# Patient Record
Sex: FEMALE | Race: WHITE | NOT HISPANIC OR LATINO | Employment: UNEMPLOYED | URBAN - METROPOLITAN AREA
[De-identification: names, ages, dates, MRNs, and addresses within clinical notes are randomized per-mention and may not be internally consistent; named-entity substitution may affect disease eponyms.]

---

## 2017-01-10 ENCOUNTER — OFFICE VISIT (OUTPATIENT)
Dept: INTERNAL MEDICINE | Facility: CLINIC | Age: 47
End: 2017-01-10

## 2017-01-10 ENCOUNTER — TELEPHONE (OUTPATIENT)
Dept: INTERNAL MEDICINE | Facility: CLINIC | Age: 47
End: 2017-01-10

## 2017-01-10 VITALS
OXYGEN SATURATION: 99 % | HEIGHT: 61 IN | WEIGHT: 176.2 LBS | BODY MASS INDEX: 33.27 KG/M2 | SYSTOLIC BLOOD PRESSURE: 110 MMHG | HEART RATE: 109 BPM | DIASTOLIC BLOOD PRESSURE: 80 MMHG

## 2017-01-10 DIAGNOSIS — J06.9 ACUTE URI: Primary | ICD-10-CM

## 2017-01-10 DIAGNOSIS — Z79.4 TYPE 2 DIABETES MELLITUS WITHOUT COMPLICATION, WITH LONG-TERM CURRENT USE OF INSULIN (HCC): ICD-10-CM

## 2017-01-10 DIAGNOSIS — Z01.818 PREOPERATIVE CLEARANCE: ICD-10-CM

## 2017-01-10 DIAGNOSIS — E11.9 TYPE 2 DIABETES MELLITUS WITHOUT COMPLICATION, WITH LONG-TERM CURRENT USE OF INSULIN (HCC): ICD-10-CM

## 2017-01-10 LAB — HBA1C MFR BLD: 11.1 %

## 2017-01-10 PROCEDURE — 83036 HEMOGLOBIN GLYCOSYLATED A1C: CPT | Performed by: INTERNAL MEDICINE

## 2017-01-10 PROCEDURE — 90471 IMMUNIZATION ADMIN: CPT | Performed by: INTERNAL MEDICINE

## 2017-01-10 PROCEDURE — 90656 IIV3 VACC NO PRSV 0.5 ML IM: CPT | Performed by: INTERNAL MEDICINE

## 2017-01-10 PROCEDURE — 99214 OFFICE O/P EST MOD 30 MIN: CPT | Performed by: INTERNAL MEDICINE

## 2017-01-10 RX ORDER — AZITHROMYCIN 250 MG/1
TABLET, FILM COATED ORAL
Qty: 6 TABLET | Refills: 0 | Status: SHIPPED | OUTPATIENT
Start: 2017-01-10 | End: 2017-04-12

## 2017-01-10 NOTE — MR AVS SNAPSHOT
Crystal CARVERATT   1/10/2017 9:20 AM   Office Visit    Dept Phone:  [phone]   Encounter #:  [id number]    Provider:  Kleber Mcdonald MD   Department:  [facility] INTERNAL MED AND PEDS                Your Full Care Plan              Today's Medication Changes          These changes are accurate as of: 1/10/17 10:30 AM.  If you have any questions, ask your nurse or doctor.               New Medication(s)Ordered:     azithromycin 250 MG tablet   Commonly known as:  ZITHROMAX Z-KENDELL   Take 2 tablets the first day, then 1 tablet daily for 4 days.   Started by:  Kleber Mcdonald MD            Where to Get Your Medications      These medications were sent to [facility] [address] [place] [address] [id number] AT [facility] [phone] [facility] [phone]   [address] [facility], [facility] [address]     Phone:  [phone]     azithromycin 250 MG tablet                  Your Updated Medication List          This list is accurate as of: 1/10/17 10:30 AM.  Always use your most recent med list.                atorvastatin 20 MG tablet   Commonly known as:  LIPITOR   Take 1 tablet by mouth daily.       azithromycin 250 MG tablet   Commonly known as:  ZITHROMAX Z-KENDELL   Take 2 tablets the first day, then 1 tablet daily for 4 days.       escitalopram 5 MG tablet   Commonly known as:  LEXAPRO   Take 1 tablet by mouth daily.       gabapentin 800 MG tablet   Commonly known as:  NEURONTIN   Take one po tid       LEVEMIR 100 UNIT/ML injection   Generic drug:  insulin detemir   INECT 25 UNITS BID       lisinopril 20 MG tablet   Commonly known as:  PRINIVIL,ZESTRIL   Take 1 tablet by mouth daily.       sitaGLIPtin-metFORMIN  MG per tablet   Commonly known as:  JANUMET   Take 1 tablet by mouth 2 (two) times a day with meals.       Thyroid 120 MG tablet   Commonly known as:  ARMOUR   Take 1 tablet by mouth daily.               We Performed the Following   "   Flu Vaccine Greater Than or Equal To 2yo PF     POC Glycosylated Hemoglobin (Hb A1C)       You Were Diagnosed With        Codes Comments    Acute URI    -  Primary ICD-10-CM: J06.9  ICD-9-CM: 465.9     Type 2 diabetes mellitus without complication, with long-term current use of insulin     ICD-10-CM: E11.9, Z79.4  ICD-9-CM: 250.00, V58.67     Preoperative clearance     ICD-10-CM: Z01.818  ICD-9-CM: V72.84       Instructions     None    Patient Instructions History      Upcoming Appointments     Visit Type Date Time Department    OFFICE VISIT 1/10/2017  9:20 AM [facility]      [employer] Signup     [facility] [employer] allows you to send messages to your doctor, view your test results, renew your prescriptions, schedule appointments, and more. To sign up, go to [employer] and click on the Sign Up Now link in the New User? box. Enter your [employer] Activation Code exactly as it appears below along with the last four digits of your Social Security Number and your Date of Birth () to complete the sign-up process. If you do not sign up before the expiration date, you must request a new code.    [employer] Activation Code: [email or web address]  Expires: 2017 10:30 AM    If you have questions, you can email [email or web address] or call [phone] to talk to our [employer] staff. Remember, [employer] is NOT to be used for urgent needs. For medical emergencies, dial 911.               Other Info from Your Visit           Allergies     Penicillins        Reason for Visit     Surgical Clearance si joint fusion       Vital Signs     Blood Pressure Pulse Height Weight Oxygen Saturation Body Mass Index    110/80 109 61\" (154.9 cm) 176 lb 3.2 oz (79.9 kg) 99% 33.29 kg/m2    Smoking Status                   Never Smoker           Problems and Diagnoses Noted     Acute upper respiratory infection    Diabetes    Preoperative clearance      Immunizations Administered     Name Date    Influenza (IM) " Preservative Free       Results     POC Glycosylated Hemoglobin (Hb A1C)      Component Value Standard Range & Units    Hemoglobin A1C 11.1 %

## 2017-01-10 NOTE — PROGRESS NOTES
Chu ERICKSON is a 46 y.o. female.     History of Present Illness   47 yo female with pmhx poorly controlled diabetes, now on levemir.  She is using 25 units bid.  Here for preoperative evaluation.  She is having low risk orthopedic surgery. Has gained a few pounds since starting insulin but is taking her medicaiton and feeling better. She is reporting mild congestion, clear drainage and no fever today.  No NVD. No cp or dyspnea no chronic cough. No nvd.  She is checking her sugars, usually over 200.  She is not completely compliant with low carb lifestyle but is trying to learn that.    The following portions of the patient's history were reviewed and updated as appropriate: allergies, current medications, past family history, past medical history, past social history, past surgical history and problem list.    Review of Systems   Constitutional: Negative for appetite change, fatigue, fever and unexpected weight change.   HENT: Positive for congestion and postnasal drip. Negative for rhinorrhea, sinus pressure and trouble swallowing.    Respiratory: Negative.  Negative for cough and chest tightness.    Cardiovascular: Negative.  Negative for chest pain, palpitations and leg swelling.   Gastrointestinal: Negative for constipation and diarrhea.   Endocrine:        As in hpi   Genitourinary: Negative for dysuria, frequency, hematuria, pelvic pain and vaginal discharge.   Musculoskeletal: Negative.    Skin: Negative.    Neurological: Negative for dizziness, light-headedness and headaches.   Hematological: Negative.    Psychiatric/Behavioral: Negative.        Objective   Physical Exam   Constitutional: She is oriented to person, place, and time. She appears well-developed and well-nourished.   HENT:   Head: Normocephalic and atraumatic.   Right Ear: External ear normal.   Left Ear: External ear normal.   Eyes: Conjunctivae and EOM are normal. Pupils are equal, round, and reactive to light. Right eye exhibits  no discharge. Left eye exhibits no discharge.   Neck: Normal range of motion. Neck supple. No tracheal deviation present. No thyromegaly present.   Cardiovascular: Normal rate, regular rhythm and normal heart sounds.  Exam reveals no friction rub.    No murmur heard.  Pulmonary/Chest: Effort normal and breath sounds normal.   Neurological: She is alert and oriented to person, place, and time.   Skin: Skin is warm and dry.   Psychiatric: She has a normal mood and affect. Her behavior is normal.   Vitals reviewed.   Hbaic today 11    1/23/17 - INR 1.0  PTT 22.7  CbC normal  UA ++protein  EKG NSR at hospital, but had a long QTc, so repeated here and is normal on our ekg.  Assessment/Plan   Crystal was seen today for surgical clearance.    Diagnoses and all orders for this visit:    Acute URI  -     azithromycin (ZITHROMAX Z-KENDELL) 250 MG tablet; Take 2 tablets the first day, then 1 tablet daily for 4 days.    Type 2 diabetes mellitus without complication, with long-term current use of insulin  -     POC Glycosylated Hemoglobin (Hb A1C)    Preoperative clearance  -     Flu Vaccine Greater Than or Equal To 2yo PF      Increase levemir to 30 units twice daily  Strict low carb diet to avoid postoperative complications  Will need SS in hospital and close follow up.  Spent 30 min in care of patient.   Labs reviewed.

## 2017-01-10 NOTE — TELEPHONE ENCOUNTER
Patients a1c was 11.1  Per Dr. Mcdonald - patient to increase insulin by 5units in the morning and evening. LVM with details.

## 2017-04-05 ENCOUNTER — TREATMENT (OUTPATIENT)
Dept: INTERNAL MEDICINE | Facility: CLINIC | Age: 47
End: 2017-04-05

## 2017-04-12 ENCOUNTER — OFFICE VISIT (OUTPATIENT)
Dept: INTERNAL MEDICINE | Facility: CLINIC | Age: 47
End: 2017-04-12

## 2017-04-12 VITALS
RESPIRATION RATE: 18 BRPM | HEIGHT: 61 IN | HEART RATE: 105 BPM | OXYGEN SATURATION: 99 % | DIASTOLIC BLOOD PRESSURE: 92 MMHG | BODY MASS INDEX: 30.96 KG/M2 | WEIGHT: 164 LBS | SYSTOLIC BLOOD PRESSURE: 142 MMHG

## 2017-04-12 DIAGNOSIS — E11.9 TYPE 2 DIABETES MELLITUS WITHOUT COMPLICATION, WITH LONG-TERM CURRENT USE OF INSULIN (HCC): ICD-10-CM

## 2017-04-12 DIAGNOSIS — I10 ESSENTIAL HYPERTENSION: ICD-10-CM

## 2017-04-12 DIAGNOSIS — R10.9 LEFT LATERAL ABDOMINAL PAIN: ICD-10-CM

## 2017-04-12 DIAGNOSIS — E78.2 MIXED HYPERLIPIDEMIA: ICD-10-CM

## 2017-04-12 DIAGNOSIS — Z79.4 TYPE 2 DIABETES MELLITUS WITHOUT COMPLICATION, WITH LONG-TERM CURRENT USE OF INSULIN (HCC): ICD-10-CM

## 2017-04-12 DIAGNOSIS — E11.10 DIABETIC KETOACIDOSIS WITHOUT COMA ASSOCIATED WITH TYPE 2 DIABETES MELLITUS (HCC): Primary | ICD-10-CM

## 2017-04-12 LAB
ALBUMIN SERPL-MCNC: 4 G/DL (ref 3.5–5.2)
ALBUMIN/GLOB SERPL: 1.4 G/DL
ALP SERPL-CCNC: 100 U/L (ref 40–129)
ALT SERPL-CCNC: 29 U/L (ref 5–33)
AST SERPL-CCNC: 20 U/L (ref 5–32)
BASOPHILS # BLD AUTO: 0.04 10*3/MM3 (ref 0–0.2)
BASOPHILS NFR BLD AUTO: 0.4 % (ref 0–2)
BILIRUB BLD-MCNC: NEGATIVE MG/DL
BILIRUB SERPL-MCNC: 0.2 MG/DL (ref 0.2–1.2)
BUN SERPL-MCNC: 20 MG/DL (ref 6–20)
BUN/CREAT SERPL: 23.3 (ref 7–25)
CALCIUM SERPL-MCNC: 9.3 MG/DL (ref 8.6–10.5)
CHLORIDE SERPL-SCNC: 100 MMOL/L (ref 98–107)
CLARITY, POC: CLEAR
CO2 SERPL-SCNC: 25.2 MMOL/L (ref 22–29)
COLOR UR: YELLOW
CREAT SERPL-MCNC: 0.86 MG/DL (ref 0.57–1)
EOSINOPHIL # BLD AUTO: 0.07 10*3/MM3 (ref 0.1–0.3)
EOSINOPHIL NFR BLD AUTO: 0.7 % (ref 0–4)
ERYTHROCYTE [DISTWIDTH] IN BLOOD BY AUTOMATED COUNT: 13 % (ref 11.5–14.5)
ERYTHROCYTE [SEDIMENTATION RATE] IN BLOOD BY WESTERGREN METHOD: 14 MM/HR (ref 0–20)
GLOBULIN SER CALC-MCNC: 2.9 GM/DL
GLUCOSE SERPL-MCNC: 328 MG/DL (ref 65–99)
GLUCOSE UR STRIP-MCNC: ABNORMAL MG/DL
HBA1C MFR BLD: 13.6 %
HCT VFR BLD AUTO: 42 % (ref 37–47)
HGB BLD-MCNC: 13.7 G/DL (ref 12–16)
IMM GRANULOCYTES # BLD: 0.05 10*3/MM3 (ref 0–0.03)
IMM GRANULOCYTES NFR BLD: 0.5 % (ref 0–0.5)
KETONES UR QL: ABNORMAL
LEUKOCYTE EST, POC: NEGATIVE
LYMPHOCYTES # BLD AUTO: 2.9 10*3/MM3 (ref 0.6–4.8)
LYMPHOCYTES NFR BLD AUTO: 30.4 % (ref 20–45)
MCH RBC QN AUTO: 27.1 PG (ref 27–31)
MCHC RBC AUTO-ENTMCNC: 32.6 G/DL (ref 31–37)
MCV RBC AUTO: 83.2 FL (ref 81–99)
MONOCYTES # BLD AUTO: 0.57 10*3/MM3 (ref 0–1)
MONOCYTES NFR BLD AUTO: 6 % (ref 3–8)
NEUTROPHILS # BLD AUTO: 5.9 10*3/MM3 (ref 1.5–8.3)
NEUTROPHILS NFR BLD AUTO: 62 % (ref 45–70)
NITRITE UR-MCNC: NEGATIVE MG/ML
NRBC BLD AUTO-RTO: 0 /100 WBC (ref 0–0)
PH UR: 6 [PH] (ref 5–8)
PLATELET # BLD AUTO: 347 10*3/MM3 (ref 140–500)
POTASSIUM SERPL-SCNC: 4.5 MMOL/L (ref 3.5–5.2)
PROT SERPL-MCNC: 6.9 G/DL (ref 6–8.5)
PROT UR STRIP-MCNC: NEGATIVE MG/DL
RBC # BLD AUTO: 5.05 10*6/MM3 (ref 4.2–5.4)
RBC # UR STRIP: NEGATIVE /UL
SODIUM SERPL-SCNC: 138 MMOL/L (ref 136–145)
SP GR UR: 1.01 (ref 1–1.03)
TSH SERPL DL<=0.005 MIU/L-ACNC: 4.89 MIU/ML (ref 0.27–4.2)
UROBILINOGEN UR QL: NORMAL
WBC # BLD AUTO: 9.53 10*3/MM3 (ref 4.8–10.8)

## 2017-04-12 PROCEDURE — 81003 URINALYSIS AUTO W/O SCOPE: CPT | Performed by: INTERNAL MEDICINE

## 2017-04-12 PROCEDURE — 99214 OFFICE O/P EST MOD 30 MIN: CPT | Performed by: INTERNAL MEDICINE

## 2017-04-12 PROCEDURE — 83036 HEMOGLOBIN GLYCOSYLATED A1C: CPT | Performed by: INTERNAL MEDICINE

## 2017-04-12 RX ORDER — FENTANYL 25 UG/H
1 PATCH TRANSDERMAL
Qty: 4 PATCH | Refills: 0 | Status: SHIPPED | OUTPATIENT
Start: 2017-04-12 | End: 2017-07-12

## 2017-04-12 RX ORDER — SULFAMETHOXAZOLE AND TRIMETHOPRIM 800; 160 MG/1; MG/1
1 TABLET ORAL 2 TIMES DAILY
Qty: 20 TABLET | Refills: 0 | Status: SHIPPED | OUTPATIENT
Start: 2017-04-12 | End: 2017-07-12

## 2017-04-12 RX ORDER — LIDOCAINE 50 MG/G
PATCH TOPICAL
Refills: 0 | COMMUNITY
Start: 2017-03-31 | End: 2017-07-12

## 2017-04-12 RX ORDER — CYCLOBENZAPRINE HCL 10 MG
TABLET ORAL
Refills: 0 | COMMUNITY
Start: 2017-02-24 | End: 2017-05-18 | Stop reason: SDUPTHER

## 2017-04-12 NOTE — PATIENT INSTRUCTIONS
Crystal was seen today for diabetes.    Diagnoses and all orders for this visit:    Diabetic ketoacidosis without coma associated with type 2 diabetes mellitus  -     POC Urinalysis Dipstick, Automated  -     POC Glycosylated Hemoglobin (Hb A1C)  -     TSH; Future  -     CBC w AUTO Differential; Future  -     Sedimentation Rate    Left lateral abdominal pain  -     sulfamethoxazole-trimethoprim (BACTRIM DS) 800-160 MG per tablet; Take 1 tablet by mouth 2 (Two) Times a Day.  -     Comprehensive metabolic panel; Future  -     CBC w AUTO Differential; Future  -     Sedimentation Rate  -     fentaNYL (DURAGESIC) 25 MCG/HR patch; Place 1 patch on the skin Every 72 (Seventy-Two) Hours.    Type 2 diabetes mellitus without complication, with long-term current use of insulin    Mixed hyperlipidemia    Essential hypertension        Increase the levemir to 35 units twice daily  Increase janumet to 100/1000 xr daily  When sugars get under 200 drop to 20 twice daily  Stop all bread, rice and noodle    Miralax once daily - one cap ful   Start fentanyl patch every 3 days  Robles and ulises today  Low risk of addiction    Left sided abdominal pain certainly sounds concerning for a complication from the fusion, but she also had L hip arthritis on plain film.  I wonder if she is now more aware of the pain in the hips with the fusion in place.  She also had a positive MRSA screen in the past, will do a course of bactrim although she certainly has no indication of bacteremia at this point. Steroids did not help her pain, but has certainly adversely affected her sugar.

## 2017-04-13 RX ORDER — PROMETHAZINE HCL 50 MG
25 TABLET ORAL EVERY 6 HOURS PRN
Qty: 15 TABLET | Refills: 0 | Status: SHIPPED | OUTPATIENT
Start: 2017-04-13 | End: 2017-07-12

## 2017-04-13 RX ORDER — ONDANSETRON 8 MG/1
8 TABLET, ORALLY DISINTEGRATING ORAL EVERY 8 HOURS PRN
Qty: 30 TABLET | Refills: 1 | Status: SHIPPED | OUTPATIENT
Start: 2017-04-13 | End: 2017-07-12

## 2017-04-14 ENCOUNTER — TELEPHONE (OUTPATIENT)
Dept: INTERNAL MEDICINE | Facility: CLINIC | Age: 47
End: 2017-04-14

## 2017-04-14 NOTE — TELEPHONE ENCOUNTER
Patient has been advised and voiced understanding. Her sugar this AM was 132. The patient states she got samples of 100/1000 Janument while in office the other day. She is feeling much better today, she has not put the patch back on today yet. She is going to attempt to take a phenergan and then attempt the patch again. She will call us and follow up next week.      ----- Message from Kleber Mcdonald MD sent at 4/13/2017  7:47 AM EDT -----  Labs are better. Na fine. Electrolytes ok.  How are her sugars.  She should now be on 30 and higher dose of janumet?

## 2017-05-18 ENCOUNTER — OFFICE VISIT (OUTPATIENT)
Dept: INTERNAL MEDICINE | Facility: CLINIC | Age: 47
End: 2017-05-18

## 2017-05-18 VITALS
DIASTOLIC BLOOD PRESSURE: 82 MMHG | OXYGEN SATURATION: 99 % | BODY MASS INDEX: 30.96 KG/M2 | HEART RATE: 89 BPM | WEIGHT: 164 LBS | SYSTOLIC BLOOD PRESSURE: 122 MMHG | HEIGHT: 61 IN

## 2017-05-18 DIAGNOSIS — E03.9 HYPOTHYROIDISM (ACQUIRED): ICD-10-CM

## 2017-05-18 DIAGNOSIS — R07.9 CHEST PAIN ON EXERTION: ICD-10-CM

## 2017-05-18 DIAGNOSIS — E11.9 TYPE 2 DIABETES MELLITUS WITHOUT COMPLICATION, WITH LONG-TERM CURRENT USE OF INSULIN (HCC): Primary | ICD-10-CM

## 2017-05-18 DIAGNOSIS — Z79.4 TYPE 2 DIABETES MELLITUS WITHOUT COMPLICATION, WITH LONG-TERM CURRENT USE OF INSULIN (HCC): Primary | ICD-10-CM

## 2017-05-18 DIAGNOSIS — I10 ESSENTIAL HYPERTENSION: ICD-10-CM

## 2017-05-18 DIAGNOSIS — E78.2 MIXED HYPERLIPIDEMIA: ICD-10-CM

## 2017-05-18 LAB
EXPIRATION DATE: NORMAL
HBA1C MFR BLD: 14 %
Lab: 692

## 2017-05-18 PROCEDURE — 83036 HEMOGLOBIN GLYCOSYLATED A1C: CPT | Performed by: INTERNAL MEDICINE

## 2017-05-18 PROCEDURE — 99214 OFFICE O/P EST MOD 30 MIN: CPT | Performed by: INTERNAL MEDICINE

## 2017-05-18 PROCEDURE — 93000 ELECTROCARDIOGRAM COMPLETE: CPT | Performed by: INTERNAL MEDICINE

## 2017-05-18 RX ORDER — INSULIN ASPART 100 [IU]/ML
15 INJECTION, SUSPENSION SUBCUTANEOUS 2 TIMES DAILY WITH MEALS
Qty: 10 ML | Refills: 12 | Status: SHIPPED | OUTPATIENT
Start: 2017-05-18 | End: 2018-06-24 | Stop reason: SDUPTHER

## 2017-05-18 RX ORDER — CYCLOBENZAPRINE HCL 10 MG
10 TABLET ORAL 3 TIMES DAILY PRN
Qty: 30 TABLET | Refills: 0 | Status: SHIPPED | OUTPATIENT
Start: 2017-05-18

## 2017-06-12 RX ORDER — GABAPENTIN 800 MG/1
TABLET ORAL
Qty: 90 TABLET | Refills: 3 | Status: SHIPPED | OUTPATIENT
Start: 2017-06-12 | End: 2017-09-21 | Stop reason: SDUPTHER

## 2017-07-12 ENCOUNTER — TELEPHONE (OUTPATIENT)
Dept: INTERNAL MEDICINE | Facility: CLINIC | Age: 47
End: 2017-07-12

## 2017-07-12 ENCOUNTER — OFFICE VISIT (OUTPATIENT)
Dept: INTERNAL MEDICINE | Facility: CLINIC | Age: 47
End: 2017-07-12

## 2017-07-12 VITALS
SYSTOLIC BLOOD PRESSURE: 140 MMHG | DIASTOLIC BLOOD PRESSURE: 80 MMHG | TEMPERATURE: 97.1 F | BODY MASS INDEX: 31.43 KG/M2 | HEART RATE: 101 BPM | WEIGHT: 166.5 LBS | HEIGHT: 61 IN | OXYGEN SATURATION: 97 %

## 2017-07-12 DIAGNOSIS — R10.9 LEFT LATERAL ABDOMINAL PAIN: ICD-10-CM

## 2017-07-12 DIAGNOSIS — R11.0 NAUSEA: Primary | ICD-10-CM

## 2017-07-12 PROCEDURE — 99214 OFFICE O/P EST MOD 30 MIN: CPT | Performed by: INTERNAL MEDICINE

## 2017-07-12 RX ORDER — FENTANYL 25 UG/H
1 PATCH TRANSDERMAL
Qty: 4 PATCH | Refills: 0 | Status: SHIPPED | OUTPATIENT
Start: 2017-07-12 | End: 2017-07-27 | Stop reason: SDUPTHER

## 2017-07-12 RX ORDER — PROMETHAZINE HCL 50 MG
25 TABLET ORAL EVERY 6 HOURS PRN
Qty: 15 TABLET | Refills: 0 | Status: SHIPPED | OUTPATIENT
Start: 2017-07-12 | End: 2017-07-12 | Stop reason: SDUPTHER

## 2017-07-12 RX ORDER — PROMETHAZINE HCL 50 MG
25 TABLET ORAL EVERY 6 HOURS PRN
Qty: 45 TABLET | Refills: 0 | Status: SHIPPED | OUTPATIENT
Start: 2017-07-12

## 2017-07-12 NOTE — TELEPHONE ENCOUNTER
Scheduled today.    ----- Message from Tammy Cox sent at 7/12/2017 12:56 PM EDT -----  Regarding: PHONE CALL & APPT  LAYLA    [phone]    Patient is experiencing pain in her leg, hip and lower part of her rear end.  She cannot sit for any extended period without experiencing pain.    Requested appointment next week.  Explained she didn't have anything next week but I could send a message back to see if she could be worked in.  Patient agreed to this and/or a call back from Santy.

## 2017-07-12 NOTE — PATIENT INSTRUCTIONS
Assessment/Plan   Crystal was seen today for hip pain and tailbone pain.    Diagnoses and all orders for this visit:    Nausea  -     promethazine (PHENERGAN) 50 MG tablet; Take 0.5 tablets by mouth Every 6 (Six) Hours As Needed for Nausea or Vomiting.    Left lateral abdominal pain  -     fentaNYL (DURAGESIC) 25 MCG/HR patch; Place 1 patch on the skin Every 72 (Seventy-Two) Hours.      Really needs to see PEPE again  Recommend   Robles and ulises today  Will be flexible, hope specialist will take over  Pain management if not better and no surgical interventions are an option.

## 2017-07-12 NOTE — PROGRESS NOTES
Subjective   Crystal ERICKSON is a 46 y.o. female.     History of Present Illness   45 yo female with PEPE care last presenting here in May.  Told by Pepe that her next appt would be August.  She was on percocet right after surgery and did ok.  She was also ok on fentanyl as long as she took phenergan too.   She has continued to have severe pain in her left buttock and down her leg. She is still awaiting hip replacement. No loss of bowel or bladder.  Terrible sleep, cant sleep.  Still unable to work.      Very tearful today.  Cant lay on her back.  Lays on right side as position of comfort.     Home sugars running in 300s.  Very stressed, claims compliance with her home insulin regimen.  Sugar is   High even with not eating but does have tea.  Gained2 pound.  The following portions of the patient's history were reviewed and updated as appropriate: allergies, current medications, past family history, past medical history, past social history, past surgical history and problem list.    Review of Systems   Constitutional: Negative.    HENT: Negative.    Respiratory: Negative.    Cardiovascular: Negative.    Gastrointestinal: Negative.    Endocrine:        Diabetes   Genitourinary: Negative.    Musculoskeletal: Positive for back pain and gait problem.   All other systems reviewed and are negative.      Objective   Physical Exam   Constitutional: She is oriented to person, place, and time. She appears well-developed and well-nourished.   HENT:   Head: Normocephalic and atraumatic.   Right Ear: External ear normal.   Left Ear: External ear normal.   Eyes: EOM are normal. Pupils are equal, round, and reactive to light.   Neck: Normal range of motion. Neck supple.   Cardiovascular: Normal rate, regular rhythm and normal heart sounds.  Exam reveals no friction rub.    No murmur heard.  Pulmonary/Chest: Effort normal and breath sounds normal.   Neurological: She is alert and oriented to person, place, and time.   Skin: Skin is warm  and dry.   Psychiatric: She has a normal mood and affect. Her behavior is normal.   Vitals reviewed.      Assessment/Plan   Crystal was seen today for hip pain and tailbone pain.    Diagnoses and all orders for this visit:    Nausea  -     promethazine (PHENERGAN) 50 MG tablet; Take 0.5 tablets by mouth Every 6 (Six) Hours As Needed for Nausea or Vomiting.    Left lateral abdominal pain  -     fentaNYL (DURAGESIC) 25 MCG/HR patch; Place 1 patch on the skin Every 72 (Seventy-Two) Hours.      Really needs to see PEPE again  Recommend   Robles and ulises today  Will be flexible, hope specialist will take over  Pain management if not better and no surgical interventions are an option.

## 2017-07-27 ENCOUNTER — TELEPHONE (OUTPATIENT)
Dept: INTERNAL MEDICINE | Facility: CLINIC | Age: 47
End: 2017-07-27

## 2017-07-27 DIAGNOSIS — R10.9 LEFT LATERAL ABDOMINAL PAIN: ICD-10-CM

## 2017-07-27 RX ORDER — FENTANYL 25 UG/H
1 PATCH TRANSDERMAL
Qty: 4 PATCH | Refills: 0 | Status: SHIPPED | OUTPATIENT
Start: 2017-07-27 | End: 2017-08-15 | Stop reason: SDUPTHER

## 2017-07-27 NOTE — TELEPHONE ENCOUNTER
----- Message from Sue Preston MA sent at 7/27/2017  8:53 AM EDT -----  PT NEEDS REFILL ON   FENTANYL PATCH    [phone]   LOV   7/12/17  ANDREINA  DONE.  NEEDS NARC.  WILL BE GIVEN TO PT AT TIME OF PICK OF SCRIPT         Pt aware to  script   And  To read and sign  narc agreement

## 2017-08-15 ENCOUNTER — TELEPHONE (OUTPATIENT)
Dept: INTERNAL MEDICINE | Facility: CLINIC | Age: 47
End: 2017-08-15

## 2017-08-15 DIAGNOSIS — R10.9 LEFT LATERAL ABDOMINAL PAIN: ICD-10-CM

## 2017-08-15 RX ORDER — FENTANYL 25 UG/H
1 PATCH TRANSDERMAL
Qty: 10 PATCH | Refills: 0 | Status: SHIPPED | OUTPATIENT
Start: 2017-08-15 | End: 2017-09-21 | Stop reason: SDUPTHER

## 2017-08-15 NOTE — TELEPHONE ENCOUNTER
----- Message from Sue Preston MA sent at 8/14/2017  4:50 PM EDT -----  Pt is wanting refill on fentanyl 25mcg # 4 I patch  Every 72 hours.   lov   7/12/17  carla and ulises      Last fill 7/27/17   [phone]        PT AWARE TO  SCRIPT. INFORMED PT THIS WOULD BE THAT LAST FILL THE DR RICH WILL DO, SHE NEEDS TO EITHER TALKED WITH HER SURGEON OR WILL BE REFERRED TO PAIN MANAGEMENT.  [facility] HAS APPT HERE SOON AND WILL DISCUSS W/ DR RICH

## 2017-09-21 ENCOUNTER — OFFICE VISIT (OUTPATIENT)
Dept: INTERNAL MEDICINE | Facility: CLINIC | Age: 47
End: 2017-09-21

## 2017-09-21 VITALS
RESPIRATION RATE: 18 BRPM | HEIGHT: 61 IN | DIASTOLIC BLOOD PRESSURE: 94 MMHG | BODY MASS INDEX: 30.78 KG/M2 | WEIGHT: 163 LBS | HEART RATE: 88 BPM | SYSTOLIC BLOOD PRESSURE: 138 MMHG | OXYGEN SATURATION: 98 %

## 2017-09-21 DIAGNOSIS — Z79.4 TYPE 2 DIABETES MELLITUS WITHOUT COMPLICATION, WITH LONG-TERM CURRENT USE OF INSULIN (HCC): ICD-10-CM

## 2017-09-21 DIAGNOSIS — M54.5 CHRONIC BILATERAL LOW BACK PAIN, WITH SCIATICA PRESENCE UNSPECIFIED: ICD-10-CM

## 2017-09-21 DIAGNOSIS — E11.9 TYPE 2 DIABETES MELLITUS WITHOUT COMPLICATION, WITH LONG-TERM CURRENT USE OF INSULIN (HCC): ICD-10-CM

## 2017-09-21 DIAGNOSIS — F33.1 MODERATE EPISODE OF RECURRENT MAJOR DEPRESSIVE DISORDER (HCC): Primary | ICD-10-CM

## 2017-09-21 DIAGNOSIS — R10.9 LEFT LATERAL ABDOMINAL PAIN: ICD-10-CM

## 2017-09-21 DIAGNOSIS — G89.29 CHRONIC BILATERAL LOW BACK PAIN, WITH SCIATICA PRESENCE UNSPECIFIED: ICD-10-CM

## 2017-09-21 PROCEDURE — 99214 OFFICE O/P EST MOD 30 MIN: CPT | Performed by: INTERNAL MEDICINE

## 2017-09-21 RX ORDER — FENTANYL 25 UG/H
1 PATCH TRANSDERMAL
Qty: 10 PATCH | Refills: 0 | Status: SHIPPED | OUTPATIENT
Start: 2017-09-21 | End: 2017-09-21

## 2017-09-21 RX ORDER — FENTANYL 75 UG/H
1 PATCH TRANSDERMAL
Qty: 10 PATCH | Refills: 0 | Status: SHIPPED | OUTPATIENT
Start: 2017-09-21 | End: 2017-10-26 | Stop reason: SDUPTHER

## 2017-09-21 RX ORDER — DULOXETIN HYDROCHLORIDE 20 MG/1
20 CAPSULE, DELAYED RELEASE ORAL DAILY
Qty: 30 CAPSULE | Refills: 2 | Status: SHIPPED | OUTPATIENT
Start: 2017-09-21

## 2017-09-21 RX ORDER — DULOXETIN HYDROCHLORIDE 20 MG/1
20 CAPSULE, DELAYED RELEASE ORAL DAILY
Qty: 30 CAPSULE | Refills: 2 | Status: SHIPPED | OUTPATIENT
Start: 2017-09-21 | End: 2017-09-21 | Stop reason: SDUPTHER

## 2017-09-21 RX ORDER — GABAPENTIN 800 MG/1
800 TABLET ORAL 3 TIMES DAILY
Qty: 90 TABLET | Refills: 3 | Status: SHIPPED | OUTPATIENT
Start: 2017-09-21 | End: 2018-03-01 | Stop reason: SDUPTHER

## 2017-09-21 NOTE — PROGRESS NOTES
Subjective   Crystal ERICKSON is a 47 y.o. female.     History of Present Illness   48 yo female with DM. She is upset with our office, perceives we are not going to help her with pain medication. She would like to switch surgeons in late January.    She is not taking her insulin level.  She has L sided abdominal pain. INability to sleep. Cannot get comfortable and will have numbness in each side.  She is getting pain in her L hip too, knows she is headed to L hip replacement. Now tearful. Having days she cant get out of bed. Better with her patch on, tolerates it.  Very tearful today.     States her depression is changing her. Family telling her she is not the person she used to be.  Totally out of novolog, has a little novolog.  Financially really struggling and is another source of stress, she is unable to work.      The following portions of the patient's history were reviewed and updated as appropriate: allergies, current medications, past family history, past medical history, past social history, past surgical history and problem list.    Review of Systems   Constitutional: Negative for appetite change, fatigue, fever and unexpected weight change.   HENT: Negative for sinus pressure and trouble swallowing.    Respiratory: Negative for cough and chest tightness.    Cardiovascular: Negative for chest pain, palpitations and leg swelling.   Gastrointestinal: Negative for constipation and diarrhea.   Genitourinary: Negative for dysuria, frequency, hematuria, pelvic pain and vaginal discharge.   Musculoskeletal: Negative.    Skin: Negative.    Neurological: Negative for dizziness, light-headedness and headaches.   Hematological: Negative.    Psychiatric/Behavioral: Negative.    All other systems reviewed and are negative.      Objective   Physical Exam   Constitutional: She appears well-developed and well-nourished.   HENT:   Head: Normocephalic and atraumatic.   Eyes: EOM are normal. Pupils are equal, round, and  reactive to light. Right eye exhibits no discharge. Left eye exhibits no discharge.   Neck: Normal range of motion. Neck supple.   Cardiovascular: Normal rate and regular rhythm.    No murmur heard.  Psychiatric: She has a normal mood and affect. Her behavior is normal.   Nursing note and vitals reviewed.      Assessment/Plan   Diagnoses and all orders for this visit:    Moderate episode of recurrent major depressive disorder  -     Discontinue: DULoxetine (CYMBALTA) 20 MG capsule; Take 1 capsule by mouth Daily.  -     DULoxetine (CYMBALTA) 20 MG capsule; Take 1 capsule by mouth Daily.    Type 2 diabetes mellitus without complication, with long-term current use of insulin    Left lateral abdominal pain  -     Discontinue: fentaNYL (DURAGESIC) 25 MCG/HR patch; Place 1 patch on the skin Every 72 (Seventy-Two) Hours.    Chronic bilateral low back pain, with sciatica presence unspecified  -     fentaNYL (DURAGESIC) 75 MCG/HR patch; Place 1 patch on the skin Every 72 (Seventy-Two) Hours.  -     gabapentin (NEURONTIN) 800 MG tablet; Take 1 tablet by mouth 3 (Three) Times a Day.    Very concerned about the psychology of her chronic pain  Clearly depressed.  Very guarded  She wrote me a letter after several no shows, upset that she was almost unable to reschedule  I have reassured her that I understand her somatization  Will increase her fentanyl today and try to ibrahim the depression with cymbalta  She is going to return in one month  Her insulin is very important, states she will start tomorrow.  She states she is not eating so would not need it.  She denies SI or HI.   Spent 30 min in care of patient.   Layton and contract utd.  Declines flu shot

## 2017-09-21 NOTE — PATIENT INSTRUCTIONS
Assessment/Plan   Diagnoses and all orders for this visit:    Moderate episode of recurrent major depressive disorder  -     Discontinue: DULoxetine (CYMBALTA) 20 MG capsule; Take 1 capsule by mouth Daily.  -     DULoxetine (CYMBALTA) 20 MG capsule; Take 1 capsule by mouth Daily.    Type 2 diabetes mellitus without complication, with long-term current use of insulin    Left lateral abdominal pain  -     Discontinue: fentaNYL (DURAGESIC) 25 MCG/HR patch; Place 1 patch on the skin Every 72 (Seventy-Two) Hours.    Chronic bilateral low back pain, with sciatica presence unspecified  -     fentaNYL (DURAGESIC) 75 MCG/HR patch; Place 1 patch on the skin Every 72 (Seventy-Two) Hours.  -     gabapentin (NEURONTIN) 800 MG tablet; Take 1 tablet by mouth 3 (Three) Times a Day.

## 2017-10-26 ENCOUNTER — OFFICE VISIT (OUTPATIENT)
Dept: INTERNAL MEDICINE | Facility: CLINIC | Age: 47
End: 2017-10-26

## 2017-10-26 VITALS
DIASTOLIC BLOOD PRESSURE: 104 MMHG | HEIGHT: 61 IN | RESPIRATION RATE: 16 BRPM | HEART RATE: 108 BPM | BODY MASS INDEX: 30.02 KG/M2 | OXYGEN SATURATION: 98 % | SYSTOLIC BLOOD PRESSURE: 160 MMHG | WEIGHT: 159 LBS

## 2017-10-26 DIAGNOSIS — M54.5 CHRONIC BILATERAL LOW BACK PAIN, WITH SCIATICA PRESENCE UNSPECIFIED: ICD-10-CM

## 2017-10-26 DIAGNOSIS — R11.0 NAUSEA: ICD-10-CM

## 2017-10-26 DIAGNOSIS — Z79.4 TYPE 2 DIABETES MELLITUS WITHOUT COMPLICATION, WITH LONG-TERM CURRENT USE OF INSULIN (HCC): Primary | ICD-10-CM

## 2017-10-26 DIAGNOSIS — E03.9 HYPOTHYROIDISM (ACQUIRED): ICD-10-CM

## 2017-10-26 DIAGNOSIS — I10 ESSENTIAL HYPERTENSION: ICD-10-CM

## 2017-10-26 DIAGNOSIS — G89.29 CHRONIC BILATERAL LOW BACK PAIN, WITH SCIATICA PRESENCE UNSPECIFIED: ICD-10-CM

## 2017-10-26 DIAGNOSIS — E11.9 TYPE 2 DIABETES MELLITUS WITHOUT COMPLICATION, WITH LONG-TERM CURRENT USE OF INSULIN (HCC): Primary | ICD-10-CM

## 2017-10-26 LAB — HBA1C MFR BLD: 14 %

## 2017-10-26 PROCEDURE — 99215 OFFICE O/P EST HI 40 MIN: CPT | Performed by: INTERNAL MEDICINE

## 2017-10-26 PROCEDURE — 83036 HEMOGLOBIN GLYCOSYLATED A1C: CPT | Performed by: INTERNAL MEDICINE

## 2017-10-26 RX ORDER — PROMETHAZINE HYDROCHLORIDE 12.5 MG/1
12.5 TABLET ORAL EVERY 6 HOURS PRN
Qty: 60 TABLET | Refills: 2 | Status: SHIPPED | OUTPATIENT
Start: 2017-10-26 | End: 2017-12-12 | Stop reason: SDUPTHER

## 2017-10-26 RX ORDER — FENTANYL 75 UG/H
1 PATCH TRANSDERMAL
Qty: 10 PATCH | Refills: 0 | Status: SHIPPED | OUTPATIENT
Start: 2017-10-26 | End: 2017-12-12 | Stop reason: SDUPTHER

## 2017-10-26 NOTE — PROGRESS NOTES
Chu ERICKSON is a 47 y.o. female.     History of Present Illness   46 yo female with pmhx recent troublewith back surgery, poor healing and chronic pain  Has Dm last seen here. Taking her insulin but refusing to give up her sweet tea.  She is taking her insulin regularly.  Mood is much better, taking cymbalta now.  Missed fentanyl dose and couldn't function. We started cymbalta and is sleeping better.    She is checking bp at home, usually in 120s/80s. Doing well on lipitor and really does not think contributing to myalgia.   The following portions of the patient's history were reviewed and updated as appropriate: allergies, current medications, past family history, past medical history, past social history, past surgical history and problem list.    Review of Systems   Constitutional: Negative.  Negative for appetite change, fatigue and fever.   HENT: Negative.    Gastrointestinal: Negative.    Endocrine:        Doing home monitoring   Genitourinary:        Denies polyuria.   Musculoskeletal: Positive for back pain, gait problem and myalgias.   Hematological: Negative.    Psychiatric/Behavioral: Positive for dysphoric mood. Negative for behavioral problems and confusion.        Mood much better.   All other systems reviewed and are negative.      Objective   Physical Exam   Constitutional: She is oriented to person, place, and time. She appears well-developed and well-nourished.   HENT:   Head: Normocephalic and atraumatic.   Eyes: EOM are normal. Pupils are equal, round, and reactive to light. Right eye exhibits no discharge. Left eye exhibits no discharge.   Neck: No thyromegaly present.   Cardiovascular: Normal rate and regular rhythm.    No murmur heard.  Pulmonary/Chest: Effort normal.   Neurological: She is alert and oriented to person, place, and time. No cranial nerve deficit.   Skin: Skin is warm.   Psychiatric: She has a normal mood and affect. Her behavior is normal.   Nursing note and vitals  reviewed.      Assessment/Plan   Diagnoses and all orders for this visit:    Type 2 diabetes mellitus without complication, with long-term current use of insulin  -     Comprehensive metabolic panel  -     CBC w AUTO Differential  -     POC Glycosylated Hemoglobin (Hb A1C)    Essential hypertension  -     Comprehensive metabolic panel    Hypothyroidism (acquired)  -     TSH  -     T4, free    Chronic bilateral low back pain, with sciatica presence unspecified  -     fentaNYL (DURAGESIC) 75 MCG/HR patch; Place 1 patch on the skin Every 72 (Seventy-Two) Hours.    Nausea  -     promethazine (PHENERGAN) 12.5 MG tablet; Take 1 tablet by mouth Every 6 (Six) Hours As Needed for Nausea or Vomiting.        HTN - running high, very anxious.  Check at home.  Can increase lisinopril to 40 if home bp over 130/80.     Increase levemir to 30 mg sq bid then if home sugars over 180, increase to 35 twice daily.  If over 180, increase to 40 twice daily  Taking novolog 15 units with meals.   Hba1c today        Fu here 3 months because not controlled  Layton and halle in chart.

## 2017-10-26 NOTE — PATIENT INSTRUCTIONS
Diagnoses and all orders for this visit:     Type 2 diabetes mellitus without complication, with long-term current use of insulin  -     Comprehensive metabolic panel  -     CBC w AUTO Differential  -     POC Glycosylated Hemoglobin (Hb A1C)     Essential hypertension  -     Comprehensive metabolic panel     Hypothyroidism (acquired)  -     TSH  -     T4, free     Chronic bilateral low back pain, with sciatica presence unspecified  -     fentaNYL (DURAGESIC) 75 MCG/HR patch; Place 1 patch on the skin Every 72 (Seventy-Two) Hours.     Nausea  -     promethazine (PHENERGAN) 12.5 MG tablet; Take 1 tablet by mouth Every 6 (Six) Hours As Needed for Nausea or Vomiting.           HTN - running high, very anxious.  Check at home.  Can increase lisinopril to 40 if home bp over 130/80.      Increase levemir to 30 mg sq bid then if home sugars over 180, increase to 35 twice daily.  If over 180, increase to 40 twice daily  Taking novolog 15 units with meals.   Hba1c today           Fu here 3 months because not controlled  Layton and halle in chart.

## 2017-10-27 ENCOUNTER — TELEPHONE (OUTPATIENT)
Dept: INTERNAL MEDICINE | Facility: CLINIC | Age: 47
End: 2017-10-27

## 2017-10-27 LAB
ALBUMIN SERPL-MCNC: 4.4 G/DL (ref 3.5–5.2)
ALBUMIN/GLOB SERPL: 1.2 G/DL
ALP SERPL-CCNC: 142 U/L (ref 40–129)
ALT SERPL-CCNC: 22 U/L (ref 5–33)
AST SERPL-CCNC: 23 U/L (ref 5–32)
BASOPHILS # BLD AUTO: 0.07 10*3/MM3 (ref 0–0.2)
BASOPHILS NFR BLD AUTO: 0.8 % (ref 0–2)
BILIRUB SERPL-MCNC: 0.3 MG/DL (ref 0.2–1.2)
BUN SERPL-MCNC: 11 MG/DL (ref 6–20)
BUN/CREAT SERPL: 14.5 (ref 7–25)
CALCIUM SERPL-MCNC: 9.9 MG/DL (ref 8.6–10.5)
CHLORIDE SERPL-SCNC: 94 MMOL/L (ref 98–107)
CO2 SERPL-SCNC: 23.1 MMOL/L (ref 22–29)
CREAT SERPL-MCNC: 0.76 MG/DL (ref 0.57–1)
EOSINOPHIL # BLD AUTO: 0.07 10*3/MM3 (ref 0.1–0.3)
EOSINOPHIL NFR BLD AUTO: 0.8 % (ref 0–4)
ERYTHROCYTE [DISTWIDTH] IN BLOOD BY AUTOMATED COUNT: 12.3 % (ref 11.5–14.5)
GFR SERPLBLD CREATININE-BSD FMLA CKD-EPI: 82 ML/MIN/1.73
GFR SERPLBLD CREATININE-BSD FMLA CKD-EPI: 99 ML/MIN/1.73
GLOBULIN SER CALC-MCNC: 3.8 GM/DL
GLUCOSE SERPL-MCNC: 390 MG/DL (ref 65–99)
HBA1C MFR BLD: 12.9 % (ref 4.8–5.6)
HCT VFR BLD AUTO: 44.9 % (ref 37–47)
HGB BLD-MCNC: 15.4 G/DL (ref 12–16)
IMM GRANULOCYTES # BLD: 0.03 10*3/MM3 (ref 0–0.03)
IMM GRANULOCYTES NFR BLD: 0.3 % (ref 0–0.5)
LYMPHOCYTES # BLD AUTO: 2.07 10*3/MM3 (ref 0.6–4.8)
LYMPHOCYTES NFR BLD AUTO: 22.2 % (ref 20–45)
MCH RBC QN AUTO: 27.5 PG (ref 27–31)
MCHC RBC AUTO-ENTMCNC: 34.3 G/DL (ref 31–37)
MCV RBC AUTO: 80.3 FL (ref 81–99)
MONOCYTES # BLD AUTO: 0.43 10*3/MM3 (ref 0–1)
MONOCYTES NFR BLD AUTO: 4.6 % (ref 3–8)
NEUTROPHILS # BLD AUTO: 6.65 10*3/MM3 (ref 1.5–8.3)
NEUTROPHILS NFR BLD AUTO: 71.3 % (ref 45–70)
NRBC BLD AUTO-RTO: 0 /100 WBC (ref 0–0)
PLATELET # BLD AUTO: 339 10*3/MM3 (ref 140–500)
POTASSIUM SERPL-SCNC: 4.7 MMOL/L (ref 3.5–5.2)
PROT SERPL-MCNC: 8.2 G/DL (ref 6–8.5)
RBC # BLD AUTO: 5.59 10*6/MM3 (ref 4.2–5.4)
SODIUM SERPL-SCNC: 131 MMOL/L (ref 136–145)
T4 FREE SERPL-MCNC: 0.5 NG/DL (ref 0.93–1.7)
TSH SERPL DL<=0.005 MIU/L-ACNC: 5.44 MIU/ML (ref 0.27–4.2)
WBC # BLD AUTO: 9.32 10*3/MM3 (ref 4.8–10.8)

## 2017-10-27 NOTE — TELEPHONE ENCOUNTER
Patient advised of results.     -- Message from Kleber Mcdonald MD sent at 10/27/2017  9:26 AM EDT -----  Diabetes number is a litte better but still 12.  Do the plan like we suggested. Fu 3months.

## 2017-12-12 ENCOUNTER — TELEPHONE (OUTPATIENT)
Dept: INTERNAL MEDICINE | Facility: CLINIC | Age: 47
End: 2017-12-12

## 2017-12-12 DIAGNOSIS — R11.0 NAUSEA: ICD-10-CM

## 2017-12-12 DIAGNOSIS — M54.5 CHRONIC BILATERAL LOW BACK PAIN, WITH SCIATICA PRESENCE UNSPECIFIED: ICD-10-CM

## 2017-12-12 DIAGNOSIS — G89.29 CHRONIC BILATERAL LOW BACK PAIN, WITH SCIATICA PRESENCE UNSPECIFIED: ICD-10-CM

## 2017-12-12 RX ORDER — PROMETHAZINE HYDROCHLORIDE 12.5 MG/1
12.5 TABLET ORAL EVERY 6 HOURS PRN
Qty: 60 TABLET | Refills: 2 | Status: SHIPPED | OUTPATIENT
Start: 2017-12-12

## 2017-12-12 RX ORDER — FENTANYL 75 UG/H
1 PATCH TRANSDERMAL
Qty: 10 PATCH | Refills: 0 | Status: SHIPPED | OUTPATIENT
Start: 2017-12-12 | End: 2018-01-31 | Stop reason: SDUPTHER

## 2017-12-12 NOTE — TELEPHONE ENCOUNTER
----- Message from Sue Preston MA sent at 12/12/2017  3:02 PM EST -----  PT CALLED NEEDS REFILL ON   FENTANYL  75 MCG  #10 USE ONE PATCH EVERY 72 HOURS   PRN   LOV  10/26/17   PEGGY AND ANDREINA    ALSO REQUESTED REFILL ON PHENERGAN THIS WAS  SENT TO  PHARMACY    [phone]         LOV   10/26/17  NARC  AND  KAPSER

## 2018-01-31 ENCOUNTER — OFFICE VISIT (OUTPATIENT)
Dept: INTERNAL MEDICINE | Facility: CLINIC | Age: 48
End: 2018-01-31

## 2018-01-31 VITALS
RESPIRATION RATE: 16 BRPM | HEART RATE: 88 BPM | HEIGHT: 61 IN | OXYGEN SATURATION: 98 % | SYSTOLIC BLOOD PRESSURE: 158 MMHG | BODY MASS INDEX: 30.36 KG/M2 | DIASTOLIC BLOOD PRESSURE: 90 MMHG | WEIGHT: 160.8 LBS

## 2018-01-31 DIAGNOSIS — M70.62 TROCHANTERIC BURSITIS, LEFT HIP: Primary | ICD-10-CM

## 2018-01-31 DIAGNOSIS — M54.5 CHRONIC BILATERAL LOW BACK PAIN, WITH SCIATICA PRESENCE UNSPECIFIED: ICD-10-CM

## 2018-01-31 DIAGNOSIS — E03.9 HYPOTHYROIDISM (ACQUIRED): ICD-10-CM

## 2018-01-31 DIAGNOSIS — Z79.4 TYPE 2 DIABETES MELLITUS WITHOUT COMPLICATION, WITH LONG-TERM CURRENT USE OF INSULIN (HCC): ICD-10-CM

## 2018-01-31 DIAGNOSIS — G89.29 CHRONIC BILATERAL LOW BACK PAIN, WITH SCIATICA PRESENCE UNSPECIFIED: ICD-10-CM

## 2018-01-31 DIAGNOSIS — E11.9 TYPE 2 DIABETES MELLITUS WITHOUT COMPLICATION, WITH LONG-TERM CURRENT USE OF INSULIN (HCC): ICD-10-CM

## 2018-01-31 PROBLEM — Z79.899 HIGH RISK MEDICATION USE: Status: ACTIVE | Noted: 2018-01-31

## 2018-01-31 PROCEDURE — 99214 OFFICE O/P EST MOD 30 MIN: CPT | Performed by: INTERNAL MEDICINE

## 2018-01-31 RX ORDER — LEVOTHYROXINE SODIUM 0.1 MG/1
100 TABLET ORAL DAILY
Qty: 30 TABLET | Refills: 2 | Status: SHIPPED | OUTPATIENT
Start: 2018-01-31 | End: 2018-07-20 | Stop reason: DRUGHIGH

## 2018-01-31 RX ORDER — FENTANYL 50 UG/H
1 PATCH TRANSDERMAL
Qty: 10 EACH | Refills: 0 | Status: SHIPPED | OUTPATIENT
Start: 2018-01-31 | End: 2018-03-29

## 2018-01-31 RX ORDER — FENTANYL 75 UG/H
1 PATCH TRANSDERMAL
Qty: 10 PATCH | Refills: 0 | Status: SHIPPED | OUTPATIENT
Start: 2018-01-31 | End: 2018-03-29 | Stop reason: SDUPTHER

## 2018-01-31 NOTE — PROGRESS NOTES
Subjective     Crystal ERICKSON is a 47 y.o. female, who presents with a chief complaint of   Chief Complaint   Patient presents with   • Diabetes   • Back Pain       HPI   48 yo female with pmhx recent lumbar fusion with Dr. Lorenzo, last seen 1/26/18. She continues to have need for fentanyl.  Pain in her Lhip worse with long car rides and sleeping on that side makes that side worse. She is doing exercises from him for her hip. Never had PT with Si surgery,lumbar fusion.    She is struggling with her DM, admits not working on it.  She refuses her Hba1c today because she wants to do better.         The following portions of the patient's history were reviewed and updated as appropriate: allergies, current medications, past family history, past medical history, past social history, past surgical history and problem list.    Allergies: Penicillins    Current Outpatient Prescriptions:   •  atorvastatin (LIPITOR) 20 MG tablet, Take 1 tablet by mouth daily., Disp: 90 tablet, Rfl: 2  •  cyclobenzaprine (FLEXERIL) 10 MG tablet, Take 1 tablet by mouth 3 (Three) Times a Day As Needed for Muscle Spasms., Disp: 30 tablet, Rfl: 0  •  DULoxetine (CYMBALTA) 20 MG capsule, Take 1 capsule by mouth Daily., Disp: 30 capsule, Rfl: 2  •  fentaNYL (DURAGESIC) 75 MCG/HR patch, Place 1 patch on the skin Every 72 (Seventy-Two) Hours., Disp: 10 patch, Rfl: 0  •  gabapentin (NEURONTIN) 800 MG tablet, Take 1 tablet by mouth 3 (Three) Times a Day., Disp: 90 tablet, Rfl: 3  •  insulin aspart prot-insulin aspart (NOVOLOG MIX 70/30) (70-30) 100 UNIT/ML injection, Inject 15 Units under the skin 2 (Two) Times a Day With Meals., Disp: 10 mL, Rfl: 12  •  LEVEMIR 100 UNIT/ML injection, INECT 25 UNITS BID, Disp: 10 mL, Rfl: 6  •  lisinopril (PRINIVIL,ZESTRIL) 20 MG tablet, Take 1 tablet by mouth daily., Disp: 90 tablet, Rfl: 2  •  promethazine (PHENERGAN) 12.5 MG tablet, Take 1 tablet by mouth Every 6 (Six) Hours As Needed for Nausea or Vomiting., Disp:  "60 tablet, Rfl: 2  •  promethazine (PHENERGAN) 50 MG tablet, Take 0.5 tablets by mouth Every 6 (Six) Hours As Needed for Nausea or Vomiting., Disp: 45 tablet, Rfl: 0  •  sitaGLIPtin-metFORMIN (JANUMET)  MG per tablet, Take 1 tablet by mouth 2 (two) times a day with meals., Disp: 180 tablet, Rfl: 2  •  fentaNYL (DURAGESIC) 50 MCG/HR patch, Place 1 patch on the skin Every 72 (Seventy-Two) Hours., Disp: 10 each, Rfl: 0  •  levothyroxine (SYNTHROID) 100 MCG tablet, Take 1 tablet by mouth Daily., Disp: 30 tablet, Rfl: 2  Medications Discontinued During This Encounter   Medication Reason   • fentaNYL (DURAGESIC) 75 MCG/HR patch Reorder   • thyroid (ARMOUR) 120 MG tablet        Review of Systems   Constitutional: Negative.  Negative for appetite change, fatigue, fever and unexpected weight change.   HENT: Negative for sinus pressure and trouble swallowing.    Respiratory: Negative for cough and chest tightness.    Cardiovascular: Negative for chest pain, palpitations and leg swelling.   Gastrointestinal: Negative for constipation and diarrhea.   Endocrine:        Sugars over 250   Genitourinary: Negative for dysuria, frequency, hematuria, pelvic pain and vaginal discharge.   Musculoskeletal: Positive for arthralgias, back pain and myalgias.   Skin: Negative.    Neurological: Negative for dizziness, light-headedness and headaches.   Hematological: Negative.    Psychiatric/Behavioral: Negative.    All other systems reviewed and are negative.      Objective     /90  Pulse 88  Resp 16  Ht 154.9 cm (61\")  Wt 72.9 kg (160 lb 12.8 oz)  SpO2 98%  BMI 30.38 kg/m2      Physical Exam   Constitutional: She is oriented to person, place, and time. She appears well-developed and well-nourished.   HENT:   Head: Normocephalic and atraumatic.   Right Ear: External ear normal.   Left Ear: External ear normal.   Eyes: Pupils are equal, round, and reactive to light. Right eye exhibits no discharge. Left eye exhibits no " discharge.   Neck: Neck supple.   Cardiovascular: Normal rate.    Pulmonary/Chest: Effort normal. No respiratory distress.   Musculoskeletal: She exhibits no edema.   Pain over L trochanter  But moving much easier today, normal gait NO paraspinal tenderness, standing upright, drastically improved   Neurological: She is alert and oriented to person, place, and time.   Skin: Skin is warm and dry.   Psychiatric: She has a normal mood and affect. Her behavior is normal.   Nursing note and vitals reviewed.      Lab Results (most recent)     None          Results for orders placed or performed in visit on 10/26/17   Comprehensive metabolic panel   Result Value Ref Range    Glucose 390 (H) 65 - 99 mg/dL    BUN 11 6 - 20 mg/dL    Creatinine 0.76 0.57 - 1.00 mg/dL    eGFR Non African Am 82 >60 mL/min/1.73    eGFR African Am 99 >60 mL/min/1.73    BUN/Creatinine Ratio 14.5 7.0 - 25.0    Sodium 131 (L) 136 - 145 mmol/L    Potassium 4.7 3.5 - 5.2 mmol/L    Chloride 94 (L) 98 - 107 mmol/L    Total CO2 23.1 22.0 - 29.0 mmol/L    Calcium 9.9 8.6 - 10.5 mg/dL    Total Protein 8.2 6.0 - 8.5 g/dL    Albumin 4.40 3.50 - 5.20 g/dL    Globulin 3.8 gm/dL    A/G Ratio 1.2 g/dL    Total Bilirubin 0.3 0.2 - 1.2 mg/dL    Alkaline Phosphatase 142 (H) 40 - 129 U/L    AST (SGOT) 23 5 - 32 U/L    ALT (SGPT) 22 5 - 33 U/L   TSH   Result Value Ref Range    TSH 5.440 (H) 0.270 - 4.200 mIU/mL   T4, free   Result Value Ref Range    Free T4 0.50 (L) 0.93 - 1.70 ng/dL   Hemoglobin A1c   Result Value Ref Range    Hemoglobin A1C 12.90 (H) 4.80 - 5.60 %   POC Glycosylated Hemoglobin (Hb A1C)   Result Value Ref Range    Hemoglobin A1C 14 %   CBC w AUTO Differential   Result Value Ref Range    WBC 9.32 4.80 - 10.80 10*3/mm3    RBC 5.59 (H) 4.20 - 5.40 10*6/mm3    Hemoglobin 15.4 12.0 - 16.0 g/dL    Hematocrit 44.9 37.0 - 47.0 %    MCV 80.3 (L) 81.0 - 99.0 fL    MCH 27.5 27.0 - 31.0 pg    MCHC 34.3 31.0 - 37.0 g/dL    RDW 12.3 11.5 - 14.5 %    Platelets 339  140 - 500 10*3/mm3    Neutrophil Rel % 71.3 (H) 45.0 - 70.0 %    Lymphocyte Rel % 22.2 20.0 - 45.0 %    Monocyte Rel % 4.6 3.0 - 8.0 %    Eosinophil Rel % 0.8 0.0 - 4.0 %    Basophil Rel % 0.8 0.0 - 2.0 %    Neutrophils Absolute 6.65 1.50 - 8.30 10*3/mm3    Lymphocytes Absolute 2.07 0.60 - 4.80 10*3/mm3    Monocytes Absolute 0.43 0.00 - 1.00 10*3/mm3    Eosinophils Absolute 0.07 (L) 0.10 - 0.30 10*3/mm3    Basophils Absolute 0.07 0.00 - 0.20 10*3/mm3    Immature Granulocyte Rel % 0.3 0.0 - 0.5 %    Immature Grans Absolute 0.03 0.00 - 0.03 10*3/mm3    nRBC 0.0 0.0 - 0.0 /100 WBC       Assessment/Plan   Crystal was seen today for diabetes and back pain.    Diagnoses and all orders for this visit:    Trochanteric bursitis, left hip  -     fentaNYL (DURAGESIC) 50 MCG/HR patch; Place 1 patch on the skin Every 72 (Seventy-Two) Hours.  -     Ambulatory Referral to Orthopedic Surgery    Type 2 diabetes mellitus without complication, with long-term current use of insulin    Chronic bilateral low back pain, with sciatica presence unspecified  -     fentaNYL (DURAGESIC) 75 MCG/HR patch; Place 1 patch on the skin Every 72 (Seventy-Two) Hours.  -     fentaNYL (DURAGESIC) 50 MCG/HR patch; Place 1 patch on the skin Every 72 (Seventy-Two) Hours.    Hypothyroidism (acquired)  -     levothyroxine (SYNTHROID) 100 MCG tablet; Take 1 tablet by mouth Daily.    Wean to fentanyl 75 every other day with 50 then change to 50 daily thereafter if tolerated  Refer to Dr. Gabriel   Conservative treatment would be ideal.  Robles and ulises  Reminder to get eye exam done  Patient declines diabetes check today.  She is going to give up sweet tea and start real carb avoidance. Here with . Both agreed.   Return in about 3 months (around 4/30/2018).    Kleber Mcdonald MD  01/31/2018

## 2018-01-31 NOTE — PATIENT INSTRUCTIONS
Assessment/Plan   Crystal was seen today for diabetes and back pain.     Diagnoses and all orders for this visit:     Trochanteric bursitis, left hip  -     fentaNYL (DURAGESIC) 50 MCG/HR patch; Place 1 patch on the skin Every 72 (Seventy-Two) Hours.  -     Ambulatory Referral to Orthopedic Surgery     Type 2 diabetes mellitus without complication, with long-term current use of insulin     Chronic bilateral low back pain, with sciatica presence unspecified  -     fentaNYL (DURAGESIC) 75 MCG/HR patch; Place 1 patch on the skin Every 72 (Seventy-Two) Hours.  -     fentaNYL (DURAGESIC) 50 MCG/HR patch; Place 1 patch on the skin Every 72 (Seventy-Two) Hours.     Hypothyroidism (acquired)  -     levothyroxine (SYNTHROID) 100 MCG tablet; Take 1 tablet by mouth Daily.     Wean to fentanyl 75 every other day with 50 then change to 50 daily thereafter if tolerated  Refer to Dr. Gabriel  Conservative treatment would be ideal.  Contract and ulises  Reminder to get eye exam done     Return in about 3 months (around 4/30/2018).     Kleber Mcdonald MD  01/31/2018

## 2018-03-01 DIAGNOSIS — G89.29 CHRONIC BILATERAL LOW BACK PAIN, WITH SCIATICA PRESENCE UNSPECIFIED: ICD-10-CM

## 2018-03-01 DIAGNOSIS — M54.5 CHRONIC BILATERAL LOW BACK PAIN, WITH SCIATICA PRESENCE UNSPECIFIED: ICD-10-CM

## 2018-03-01 RX ORDER — GABAPENTIN 800 MG/1
TABLET ORAL
Qty: 90 TABLET | Refills: 5 | OUTPATIENT
Start: 2018-03-01 | End: 2018-07-18 | Stop reason: SDUPTHER

## 2018-03-13 RX ORDER — INSULIN DETEMIR 100 [IU]/ML
INJECTION, SOLUTION SUBCUTANEOUS
Qty: 10 ML | Refills: 0 | Status: SHIPPED | OUTPATIENT
Start: 2018-03-13 | End: 2018-03-29 | Stop reason: SDUPTHER

## 2018-03-16 ENCOUNTER — TELEPHONE (OUTPATIENT)
Dept: INTERNAL MEDICINE | Facility: CLINIC | Age: 48
End: 2018-03-16

## 2018-03-16 NOTE — TELEPHONE ENCOUNTER
Spoke with patient  - per dr frost, patient really needs to contact spine surgeon to manage the pain quicker. Patiented stated she is going through a process currently with them and her insurance getting a series of injections before they will cover her surgery. Patient is to see dr. frost on 3/29/2018 and wants to further discuss then.      ----- Message from Cassie Loo MA sent at 3/12/2018 12:01 PM EDT -----  Regarding: FW: NEEDS MEDICATIONS ADJUSTED FOR PAIN  Contact: [phone]      ----- Message -----  From: Josesito Juarez  Sent: 3/12/2018  11:43 AM  To: Shannan Olea [facility]  Subject: NEEDS MEDICATIONS ADJUSTED FOR PAIN              LAYLA PT    Patient called to say that she is going to have joint fusion surgery, she has been getting steroid shots to help with the pain until her surgery but nothing is helping with the pain. She scheduled a follow up aptmnt for 03/29/18 at 2 pm, ( I could not find any afternoon appointments until this date). She asked if Santy could please call her back.    Thanks!  josesito

## 2018-03-29 ENCOUNTER — OFFICE VISIT (OUTPATIENT)
Dept: INTERNAL MEDICINE | Facility: CLINIC | Age: 48
End: 2018-03-29

## 2018-03-29 VITALS
RESPIRATION RATE: 18 BRPM | SYSTOLIC BLOOD PRESSURE: 164 MMHG | HEIGHT: 61 IN | HEART RATE: 97 BPM | WEIGHT: 164 LBS | DIASTOLIC BLOOD PRESSURE: 70 MMHG | TEMPERATURE: 98.7 F | BODY MASS INDEX: 30.96 KG/M2 | OXYGEN SATURATION: 98 %

## 2018-03-29 DIAGNOSIS — Z79.4 TYPE 2 DIABETES MELLITUS WITHOUT COMPLICATION, WITH LONG-TERM CURRENT USE OF INSULIN (HCC): ICD-10-CM

## 2018-03-29 DIAGNOSIS — Z79.899 HIGH RISK MEDICATION USE: Primary | ICD-10-CM

## 2018-03-29 DIAGNOSIS — E03.9 HYPOTHYROIDISM (ACQUIRED): ICD-10-CM

## 2018-03-29 DIAGNOSIS — M54.5 CHRONIC BILATERAL LOW BACK PAIN, WITH SCIATICA PRESENCE UNSPECIFIED: ICD-10-CM

## 2018-03-29 DIAGNOSIS — G89.29 CHRONIC BILATERAL LOW BACK PAIN, WITH SCIATICA PRESENCE UNSPECIFIED: ICD-10-CM

## 2018-03-29 DIAGNOSIS — I10 ESSENTIAL HYPERTENSION: ICD-10-CM

## 2018-03-29 DIAGNOSIS — E11.9 TYPE 2 DIABETES MELLITUS WITHOUT COMPLICATION, WITH LONG-TERM CURRENT USE OF INSULIN (HCC): ICD-10-CM

## 2018-03-29 PROBLEM — M53.3 SI (SACROILIAC) JOINT DYSFUNCTION: Status: ACTIVE | Noted: 2018-03-29

## 2018-03-29 LAB — HBA1C MFR BLD: 14 %

## 2018-03-29 PROCEDURE — 83036 HEMOGLOBIN GLYCOSYLATED A1C: CPT | Performed by: INTERNAL MEDICINE

## 2018-03-29 PROCEDURE — 99214 OFFICE O/P EST MOD 30 MIN: CPT | Performed by: INTERNAL MEDICINE

## 2018-03-29 RX ORDER — FENTANYL 75 UG/H
1 PATCH TRANSDERMAL
Qty: 10 PATCH | Refills: 0 | Status: SHIPPED | OUTPATIENT
Start: 2018-03-29 | End: 2018-05-23 | Stop reason: SDUPTHER

## 2018-03-29 RX ORDER — INSULIN DETEMIR 100 [IU]/ML
30 INJECTION, SOLUTION SUBCUTANEOUS 2 TIMES DAILY
Qty: 10 ML | Refills: 3 | Status: SHIPPED | OUTPATIENT
Start: 2018-03-29 | End: 2018-04-11 | Stop reason: SDUPTHER

## 2018-03-29 NOTE — PROGRESS NOTES
Crystal ERICKSON is a 47 y.o. female, who presents with a chief complaint of   Chief Complaint   Patient presents with   • Diabetes       HPI     46 yo female with pmhx SI pain, facing another surgery with ortho spine. Had first SI injection with little relief. Has a second injection scheduled.  She did see Dr. Gabriel and he agreed she needed a second SI joint infusion.      BP running higher due to increased ibuprofen use when her pain is higher on fentanyl dose, weaning to 50.     The following portions of the patient's history were reviewed and updated as appropriate: allergies, current medications, past family history, past medical history, past social history, past surgical history and problem list.    Allergies: Penicillins    Current Outpatient Prescriptions:   •  atorvastatin (LIPITOR) 20 MG tablet, Take 1 tablet by mouth daily., Disp: 90 tablet, Rfl: 2  •  cyclobenzaprine (FLEXERIL) 10 MG tablet, Take 1 tablet by mouth 3 (Three) Times a Day As Needed for Muscle Spasms., Disp: 30 tablet, Rfl: 0  •  DULoxetine (CYMBALTA) 20 MG capsule, Take 1 capsule by mouth Daily., Disp: 30 capsule, Rfl: 2  •  fentaNYL (DURAGESIC) 75 MCG/HR patch, Place 1 patch on the skin Every 72 (Seventy-Two) Hours., Disp: 10 patch, Rfl: 0  •  gabapentin (NEURONTIN) 800 MG tablet, TAKE 1 TABLET BY MOUTH THREE TIMES DAILY, Disp: 90 tablet, Rfl: 5  •  insulin aspart prot-insulin aspart (NOVOLOG MIX 70/30) (70-30) 100 UNIT/ML injection, Inject 15 Units under the skin 2 (Two) Times a Day With Meals., Disp: 10 mL, Rfl: 12  •  LEVEMIR 100 UNIT/ML injection, Inject 30 Units under the skin 2 (Two) Times a Day., Disp: 10 mL, Rfl: 3  •  levothyroxine (SYNTHROID) 100 MCG tablet, Take 1 tablet by mouth Daily., Disp: 30 tablet, Rfl: 2  •  lisinopril (PRINIVIL,ZESTRIL) 20 MG tablet, Take 1 tablet by mouth daily., Disp: 90 tablet, Rfl: 2  •  promethazine (PHENERGAN) 12.5 MG tablet, Take 1 tablet by mouth Every 6 (Six) Hours As Needed for Nausea or  "Vomiting., Disp: 60 tablet, Rfl: 2  •  promethazine (PHENERGAN) 50 MG tablet, Take 0.5 tablets by mouth Every 6 (Six) Hours As Needed for Nausea or Vomiting., Disp: 45 tablet, Rfl: 0  •  sitaGLIPtin-metFORMIN (JANUMET)  MG per tablet, Take 1 tablet by mouth 2 (two) times a day with meals., Disp: 180 tablet, Rfl: 2  Medications Discontinued During This Encounter   Medication Reason   • fentaNYL (DURAGESIC) 50 MCG/HR patch    • fentaNYL (DURAGESIC) 75 MCG/HR patch Reorder   • LEVEMIR 100 UNIT/ML injection Reorder       Review of Systems   Constitutional: Negative.    HENT: Negative.    Respiratory: Negative.    Endocrine:        Poor control, thinks getting better   Genitourinary: Negative.    Musculoskeletal: Positive for back pain.        Hip pain   Skin: Negative.    Neurological: Negative for headaches.   Psychiatric/Behavioral: Positive for sleep disturbance. The patient is nervous/anxious.    All other systems reviewed and are negative.            /70   Pulse 97   Temp 98.7 °F (37.1 °C)   Resp 18   Ht 154.9 cm (61\")   Wt 74.4 kg (164 lb)   SpO2 98%   BMI 30.99 kg/m²       Physical Exam   Constitutional: She is oriented to person, place, and time. She appears well-developed and well-nourished.   HENT:   Head: Normocephalic and atraumatic.   Right Ear: External ear normal.   Left Ear: External ear normal.   Eyes: EOM are normal. Pupils are equal, round, and reactive to light.   Neck: Normal range of motion. Neck supple. No thyromegaly present.   Cardiovascular: Normal rate, regular rhythm and normal heart sounds.    No murmur heard.  Pulmonary/Chest: Effort normal and breath sounds normal.   Neurological: She is alert and oriented to person, place, and time.   Skin: Skin is warm and dry. No erythema.   Psychiatric: She has a normal mood and affect. Her behavior is normal.   Nursing note and vitals reviewed.      Lab Results (most recent)     None          Results for orders placed or performed " in visit on 03/29/18   POC Glycosylated Hemoglobin (Hb A1C)   Result Value Ref Range    Hemoglobin A1C 14 %           Crystal was seen today for diabetes.    Diagnoses and all orders for this visit:    High risk medication use  -     POC Glycosylated Hemoglobin (Hb A1C)    Type 2 diabetes mellitus without complication, with long-term current use of insulin  -     LEVEMIR 100 UNIT/ML injection; Inject 30 Units under the skin 2 (Two) Times a Day.  -     POC Glycosylated Hemoglobin (Hb A1C)    Hypothyroidism (acquired)  -     POC Glycosylated Hemoglobin (Hb A1C)    Essential hypertension  -     POC Glycosylated Hemoglobin (Hb A1C)    Chronic bilateral low back pain, with sciatica presence unspecified  -     fentaNYL (DURAGESIC) 75 MCG/HR patch; Place 1 patch on the skin Every 72 (Seventy-Two) Hours.  -     POC Glycosylated Hemoglobin (Hb A1C)    Hba1c today >14  Will increase her levemir by 10 every 2-3 days if fasting accuchecks over 170  HTN monitor at home, increased her fentanyl patch until has her IS intervention  I am going to talk to Dr. Gabriel about taking over her care regarding her Si joint.  Will coordinate care with him  FU 3 months.     The patient has read and signed the [place] Controlled Substance Contract.  I will continue to see patient for regular follow up appointments.  They are well controlled on their medication.  ANDREINA is updated every 3 months. The patient is aware of the potential for addiction and dependence.    Return in about 3 months (around 6/29/2018).    Kleber Mcdonald MD  03/29/2018

## 2018-04-11 DIAGNOSIS — Z79.4 TYPE 2 DIABETES MELLITUS WITHOUT COMPLICATION, WITH LONG-TERM CURRENT USE OF INSULIN (HCC): ICD-10-CM

## 2018-04-11 DIAGNOSIS — E11.9 TYPE 2 DIABETES MELLITUS WITHOUT COMPLICATION, WITH LONG-TERM CURRENT USE OF INSULIN (HCC): ICD-10-CM

## 2018-04-11 RX ORDER — INSULIN DETEMIR 100 [IU]/ML
INJECTION, SOLUTION SUBCUTANEOUS
Qty: 10 ML | Refills: 2 | Status: SHIPPED | OUTPATIENT
Start: 2018-04-11 | End: 2018-07-18 | Stop reason: SDUPTHER

## 2018-05-23 DIAGNOSIS — M54.5 CHRONIC BILATERAL LOW BACK PAIN, WITH SCIATICA PRESENCE UNSPECIFIED: ICD-10-CM

## 2018-05-23 DIAGNOSIS — G89.29 CHRONIC BILATERAL LOW BACK PAIN, WITH SCIATICA PRESENCE UNSPECIFIED: ICD-10-CM

## 2018-05-24 RX ORDER — FENTANYL 75 UG/H
1 PATCH TRANSDERMAL
Qty: 10 PATCH | Refills: 0 | Status: SHIPPED | OUTPATIENT
Start: 2018-05-24 | End: 2018-07-18 | Stop reason: SDUPTHER

## 2018-06-24 DIAGNOSIS — E11.9 TYPE 2 DIABETES MELLITUS WITHOUT COMPLICATION, WITH LONG-TERM CURRENT USE OF INSULIN (HCC): ICD-10-CM

## 2018-06-24 DIAGNOSIS — Z79.4 TYPE 2 DIABETES MELLITUS WITHOUT COMPLICATION, WITH LONG-TERM CURRENT USE OF INSULIN (HCC): ICD-10-CM

## 2018-06-25 RX ORDER — INSULIN ASPART 100 [IU]/ML
INJECTION, SUSPENSION SUBCUTANEOUS
Qty: 10 ML | Refills: 0 | Status: SHIPPED | OUTPATIENT
Start: 2018-06-25 | End: 2018-07-18 | Stop reason: SDUPTHER

## 2018-07-03 RX ORDER — ONDANSETRON 8 MG/1
TABLET, ORALLY DISINTEGRATING ORAL
Qty: 30 TABLET | Refills: 0 | Status: SHIPPED | OUTPATIENT
Start: 2018-07-03

## 2018-07-18 ENCOUNTER — OFFICE VISIT (OUTPATIENT)
Dept: INTERNAL MEDICINE | Facility: CLINIC | Age: 48
End: 2018-07-18

## 2018-07-18 VITALS
WEIGHT: 170 LBS | HEART RATE: 117 BPM | DIASTOLIC BLOOD PRESSURE: 78 MMHG | BODY MASS INDEX: 32.1 KG/M2 | RESPIRATION RATE: 16 BRPM | HEIGHT: 61 IN | SYSTOLIC BLOOD PRESSURE: 164 MMHG | OXYGEN SATURATION: 97 %

## 2018-07-18 DIAGNOSIS — M54.5 CHRONIC BILATERAL LOW BACK PAIN, WITH SCIATICA PRESENCE UNSPECIFIED: ICD-10-CM

## 2018-07-18 DIAGNOSIS — E11.9 TYPE 2 DIABETES MELLITUS WITHOUT COMPLICATION, WITH LONG-TERM CURRENT USE OF INSULIN (HCC): ICD-10-CM

## 2018-07-18 DIAGNOSIS — Z79.4 TYPE 2 DIABETES MELLITUS WITHOUT COMPLICATION, WITH LONG-TERM CURRENT USE OF INSULIN (HCC): ICD-10-CM

## 2018-07-18 DIAGNOSIS — E03.9 HYPOTHYROIDISM (ACQUIRED): ICD-10-CM

## 2018-07-18 DIAGNOSIS — G89.29 CHRONIC BILATERAL LOW BACK PAIN, WITH SCIATICA PRESENCE UNSPECIFIED: ICD-10-CM

## 2018-07-18 DIAGNOSIS — I10 ESSENTIAL HYPERTENSION: Primary | ICD-10-CM

## 2018-07-18 DIAGNOSIS — E78.2 MIXED HYPERLIPIDEMIA: ICD-10-CM

## 2018-07-18 LAB
ALBUMIN SERPL-MCNC: 3.9 G/DL (ref 3.5–5.2)
ALBUMIN/GLOB SERPL: 1.1 G/DL
ALP SERPL-CCNC: 130 U/L (ref 40–129)
ALT SERPL-CCNC: 35 U/L (ref 5–33)
AST SERPL-CCNC: 34 U/L (ref 5–32)
BASOPHILS # BLD AUTO: 0.05 10*3/MM3 (ref 0–0.2)
BASOPHILS NFR BLD AUTO: 0.8 % (ref 0–2)
BILIRUB SERPL-MCNC: 0.4 MG/DL (ref 0.2–1.2)
BUN SERPL-MCNC: 11 MG/DL (ref 6–20)
BUN/CREAT SERPL: 16.4 (ref 7–25)
CALCIUM SERPL-MCNC: 9.7 MG/DL (ref 8.6–10.5)
CHLORIDE SERPL-SCNC: 101 MMOL/L (ref 98–107)
CHOLEST SERPL-MCNC: 309 MG/DL (ref 0–200)
CO2 SERPL-SCNC: 27 MMOL/L (ref 22–29)
CREAT SERPL-MCNC: 0.67 MG/DL (ref 0.57–1)
EOSINOPHIL # BLD AUTO: 0.2 10*3/MM3 (ref 0.1–0.3)
EOSINOPHIL NFR BLD AUTO: 3 % (ref 0–4)
ERYTHROCYTE [DISTWIDTH] IN BLOOD BY AUTOMATED COUNT: 12.4 % (ref 11.5–14.5)
GLOBULIN SER CALC-MCNC: 3.4 GM/DL
GLUCOSE SERPL-MCNC: 114 MG/DL (ref 65–99)
HCT VFR BLD AUTO: 45.8 % (ref 37–47)
HDLC SERPL-MCNC: 43 MG/DL (ref 40–60)
HGB BLD-MCNC: 14.9 G/DL (ref 12–16)
IMM GRANULOCYTES # BLD: 0.03 10*3/MM3 (ref 0–0.03)
IMM GRANULOCYTES NFR BLD: 0.5 % (ref 0–0.5)
LDLC SERPL CALC-MCNC: 201 MG/DL (ref 0–100)
LDLC/HDLC SERPL: 4.68 {RATIO}
LYMPHOCYTES # BLD AUTO: 2.36 10*3/MM3 (ref 0.6–4.8)
LYMPHOCYTES NFR BLD AUTO: 35.9 % (ref 20–45)
MCH RBC QN AUTO: 27.7 PG (ref 27–31)
MCHC RBC AUTO-ENTMCNC: 32.5 G/DL (ref 31–37)
MCV RBC AUTO: 85.3 FL (ref 81–99)
MONOCYTES # BLD AUTO: 0.48 10*3/MM3 (ref 0–1)
MONOCYTES NFR BLD AUTO: 7.3 % (ref 3–8)
NEUTROPHILS # BLD AUTO: 3.46 10*3/MM3 (ref 1.5–8.3)
NEUTROPHILS NFR BLD AUTO: 52.5 % (ref 45–70)
NRBC BLD AUTO-RTO: 0 /100 WBC (ref 0–0)
PLATELET # BLD AUTO: 359 10*3/MM3 (ref 140–500)
POTASSIUM SERPL-SCNC: 4.5 MMOL/L (ref 3.5–5.2)
PROT SERPL-MCNC: 7.3 G/DL (ref 6–8.5)
RBC # BLD AUTO: 5.37 10*6/MM3 (ref 4.2–5.4)
SODIUM SERPL-SCNC: 141 MMOL/L (ref 136–145)
TRIGL SERPL-MCNC: 323 MG/DL (ref 0–150)
TSH SERPL DL<=0.005 MIU/L-ACNC: 15.02 MIU/ML (ref 0.27–4.2)
VLDLC SERPL CALC-MCNC: 64.6 MG/DL (ref 7–27)
WBC # BLD AUTO: 6.58 10*3/MM3 (ref 4.8–10.8)

## 2018-07-18 PROCEDURE — 99214 OFFICE O/P EST MOD 30 MIN: CPT | Performed by: INTERNAL MEDICINE

## 2018-07-18 RX ORDER — FENTANYL 75 UG/H
1 PATCH TRANSDERMAL
Qty: 10 PATCH | Refills: 0 | Status: SHIPPED | OUTPATIENT
Start: 2018-07-18 | End: 2018-07-19 | Stop reason: SDUPTHER

## 2018-07-18 RX ORDER — GABAPENTIN 800 MG/1
800 TABLET ORAL 3 TIMES DAILY
Qty: 90 TABLET | Refills: 5 | OUTPATIENT
Start: 2018-07-18 | End: 2018-09-07 | Stop reason: SDUPTHER

## 2018-07-18 RX ORDER — INSULIN ASPART 100 [IU]/ML
15 INJECTION, SUSPENSION SUBCUTANEOUS 2 TIMES DAILY WITH MEALS
Qty: 30 ML | Refills: 3 | Status: SHIPPED | OUTPATIENT
Start: 2018-07-18

## 2018-07-18 RX ORDER — FENTANYL 75 UG/H
1 PATCH TRANSDERMAL
Qty: 10 PATCH | Refills: 0 | Status: CANCELLED | OUTPATIENT
Start: 2018-07-18

## 2018-07-18 NOTE — PATIENT INSTRUCTIONS
Crystal was seen today for diabetes.    Diagnoses and all orders for this visit:    Essential hypertension  -     Comprehensive metabolic panel    Hypothyroidism (acquired)  -     TSH  -     CBC w AUTO Differential  -     Comprehensive metabolic panel    Mixed hyperlipidemia  -     Lipid Panel With LDL/HDL Ratio  -     CBC w AUTO Differential    Type 2 diabetes mellitus without complication, with long-term current use of insulin (CMS/HCC)  -     CBC w AUTO Differential    Chronic bilateral low back pain, with sciatica presence unspecified  -     fentaNYL (DURAGESIC) 75 MCG/HR patch; Place 1 patch on the skin Every 72 (Seventy-Two) Hours.  -     gabapentin (NEURONTIN) 800 MG tablet; Take 1 tablet by mouth 3 (Three) Times a Day.      Check a few times per week at home  Goal <130/80  If not controlled will need increase lisinopril to 40 mg po daily    We are going to increase novolog by 2 units each mean if over 150.  Use 1 unit +2 if over 200.  Levemir 25 bid for 90 day supply.       Return in about 3 months (around 10/18/2018).    Kleber Mcdonald MD  07/18/2018

## 2018-07-18 NOTE — PROGRESS NOTES
Crystal ERICKSON is a 47 y.o. female, who presents with a chief complaint of   Chief Complaint   Patient presents with   • Diabetes       HPI     46 yo female with pmhx DM  She is doing her insulin now in 90 day supply    Her weight has gone up.  She notes her insulin makes her gain weight.   She dis have a low once of 64, shaky  She did not eat and it corrected easily  Her BP is getting up at times with stress. She does not check her blood pressure at home, gets flushing and headache with it elevating.               The following portions of the patient's history were reviewed and updated as appropriate: allergies, current medications, past family history, past medical history, past social history, past surgical history and problem list.    Allergies: Penicillins    Current Outpatient Prescriptions:   •  atorvastatin (LIPITOR) 20 MG tablet, Take 1 tablet by mouth daily., Disp: 90 tablet, Rfl: 2  •  cyclobenzaprine (FLEXERIL) 10 MG tablet, Take 1 tablet by mouth 3 (Three) Times a Day As Needed for Muscle Spasms., Disp: 30 tablet, Rfl: 0  •  DULoxetine (CYMBALTA) 20 MG capsule, Take 1 capsule by mouth Daily., Disp: 30 capsule, Rfl: 2  •  fentaNYL (DURAGESIC) 75 MCG/HR patch, Place 1 patch on the skin Every 72 (Seventy-Two) Hours., Disp: 10 patch, Rfl: 0  •  gabapentin (NEURONTIN) 800 MG tablet, Take 1 tablet by mouth 3 (Three) Times a Day., Disp: 90 tablet, Rfl: 5  •  levothyroxine (SYNTHROID) 100 MCG tablet, Take 1 tablet by mouth Daily., Disp: 30 tablet, Rfl: 2  •  lisinopril (PRINIVIL,ZESTRIL) 20 MG tablet, Take 1 tablet by mouth daily., Disp: 90 tablet, Rfl: 2  •  ondansetron ODT (ZOFRAN-ODT) 8 MG disintegrating tablet, DISSOLVE 1 TABLET ON THE TONGUE EVERY 8 HOURS AS NEEDED FOR NAUSEA OR VOMITING, Disp: 30 tablet, Rfl: 0  •  promethazine (PHENERGAN) 12.5 MG tablet, Take 1 tablet by mouth Every 6 (Six) Hours As Needed for Nausea or Vomiting., Disp: 60 tablet, Rfl: 2  •  promethazine (PHENERGAN) 50 MG tablet,  "Take 0.5 tablets by mouth Every 6 (Six) Hours As Needed for Nausea or Vomiting., Disp: 45 tablet, Rfl: 0  •  sitaGLIPtin-metFORMIN (JANUMET)  MG per tablet, Take 1 tablet by mouth 2 (two) times a day with meals., Disp: 180 tablet, Rfl: 2  •  insulin aspart prot-insulin aspart (NOVOLOG MIX 70/30) (70-30) 100 UNIT/ML injection, Inject 15 Units under the skin 2 (Two) Times a Day With Meals., Disp: 30 mL, Rfl: 3  •  insulin detemir (LEVEMIR) 100 UNIT/ML injection, Inject 25 Units under the skin 2 (Two) Times a Day., Disp: 30 mL, Rfl: 3  Medications Discontinued During This Encounter   Medication Reason   • fentaNYL (DURAGESIC) 75 MCG/HR patch Reorder   • gabapentin (NEURONTIN) 800 MG tablet Reorder       Review of Systems   Constitutional: Negative for appetite change, fatigue, fever and unexpected weight change.   HENT: Negative for sinus pressure and trouble swallowing.    Respiratory: Negative for cough and chest tightness.    Cardiovascular: Negative for chest pain, palpitations and leg swelling.   Gastrointestinal: Negative for constipation and diarrhea.   Genitourinary: Negative for dysuria, frequency, hematuria, pelvic pain and vaginal discharge.   Musculoskeletal: Negative.    Skin: Negative.    Neurological: Negative for dizziness, light-headedness and headaches.   Hematological: Negative.    Psychiatric/Behavioral: Negative.              /78   Pulse 117   Resp 16   Ht 154.9 cm (61\")   Wt 77.1 kg (170 lb)   SpO2 97%   BMI 32.12 kg/m²       Physical Exam   Constitutional: She is oriented to person, place, and time. She appears well-developed and well-nourished.   HENT:   Head: Normocephalic and atraumatic.   Right Ear: External ear normal.   Left Ear: External ear normal.   Eyes: Pupils are equal, round, and reactive to light. EOM are normal.   Neck: Normal range of motion. Neck supple. No JVD present. No tracheal deviation present. No thyromegaly present.   Cardiovascular: Normal rate, " regular rhythm and normal heart sounds.  Exam reveals no friction rub.    No murmur heard.  Pulmonary/Chest: Effort normal and breath sounds normal.   Lymphadenopathy:     She has no cervical adenopathy.   Neurological: She is alert and oriented to person, place, and time.   Skin: Skin is warm and dry.   Psychiatric: She has a normal mood and affect. Her behavior is normal.   Vitals reviewed.      Lab Results (most recent)     None          Results for orders placed or performed in visit on 03/29/18   POC Glycosylated Hemoglobin (Hb A1C)   Result Value Ref Range    Hemoglobin A1C 14 %           Crystal was seen today for diabetes.    Diagnoses and all orders for this visit:    Essential hypertension  -     Comprehensive metabolic panel    Hypothyroidism (acquired)  -     TSH  -     CBC w AUTO Differential  -     Comprehensive metabolic panel    Mixed hyperlipidemia  -     Lipid Panel With LDL/HDL Ratio  -     CBC w AUTO Differential    Type 2 diabetes mellitus without complication, with long-term current use of insulin (CMS/HCC)  -     CBC w AUTO Differential    Chronic bilateral low back pain, with sciatica presence unspecified  -     fentaNYL (DURAGESIC) 75 MCG/HR patch; Place 1 patch on the skin Every 72 (Seventy-Two) Hours.  -     gabapentin (NEURONTIN) 800 MG tablet; Take 1 tablet by mouth 3 (Three) Times a Day.      Check a few times per week at home  Goal <130/80  If not controlled will need increase lisinopril to 40 mg po daily    We are going to increase novolog by 2 units each mean if over 150.  Use 1 unit +2 if over 200.  Levemir 25 bid for 90 day supply.       Return in about 3 months (around 10/18/2018).    Kleber Mcdonald MD  07/18/2018

## 2018-07-19 DIAGNOSIS — M54.5 CHRONIC BILATERAL LOW BACK PAIN, WITH SCIATICA PRESENCE UNSPECIFIED: ICD-10-CM

## 2018-07-19 DIAGNOSIS — G89.29 CHRONIC BILATERAL LOW BACK PAIN, WITH SCIATICA PRESENCE UNSPECIFIED: ICD-10-CM

## 2018-07-19 RX ORDER — FENTANYL 75 UG/H
1 PATCH TRANSDERMAL
Qty: 10 PATCH | Refills: 0 | Status: SHIPPED | OUTPATIENT
Start: 2018-07-19

## 2018-07-19 RX ORDER — FENTANYL 75 UG/H
1 PATCH TRANSDERMAL
Qty: 10 PATCH | Refills: 0 | Status: CANCELLED | OUTPATIENT
Start: 2018-07-19

## 2018-07-20 ENCOUNTER — TELEPHONE (OUTPATIENT)
Dept: INTERNAL MEDICINE | Facility: CLINIC | Age: 48
End: 2018-07-20

## 2018-07-20 RX ORDER — LEVOTHYROXINE SODIUM 0.12 MG/1
125 TABLET ORAL DAILY
Qty: 30 TABLET | Refills: 3 | Status: SHIPPED | OUTPATIENT
Start: 2018-07-20

## 2018-07-20 NOTE — TELEPHONE ENCOUNTER
Patient advised. Med sent in.     ----- Message from Kleber Mcdonald MD sent at 7/19/2018  4:34 PM EDT -----  1.  Thyroid is way off.  See if taking on empty stomach every day.  IF so, we need to increase to 125 mcg po daily.  2.  Triglycerides are way way better  Chol total dropped by 70 points which is great  LDL still not to goal, but will continue to work on that.   3.  Remind her to increase her novolog with her meals.    Want her to go ahead and increase her morning dose by 5 units of levemir.

## 2018-08-13 ENCOUNTER — OFFICE VISIT (OUTPATIENT)
Dept: INTERNAL MEDICINE | Facility: CLINIC | Age: 48
End: 2018-08-13

## 2018-08-13 VITALS
SYSTOLIC BLOOD PRESSURE: 130 MMHG | OXYGEN SATURATION: 98 % | BODY MASS INDEX: 34.36 KG/M2 | TEMPERATURE: 98 F | RESPIRATION RATE: 16 BRPM | HEIGHT: 60 IN | WEIGHT: 175 LBS | DIASTOLIC BLOOD PRESSURE: 80 MMHG | HEART RATE: 92 BPM

## 2018-08-13 DIAGNOSIS — E78.2 MIXED HYPERLIPIDEMIA: ICD-10-CM

## 2018-08-13 DIAGNOSIS — I10 ESSENTIAL HYPERTENSION: ICD-10-CM

## 2018-08-13 DIAGNOSIS — Z79.4 TYPE 2 DIABETES MELLITUS WITHOUT COMPLICATION, WITH LONG-TERM CURRENT USE OF INSULIN (HCC): ICD-10-CM

## 2018-08-13 DIAGNOSIS — E11.9 TYPE 2 DIABETES MELLITUS WITHOUT COMPLICATION, WITH LONG-TERM CURRENT USE OF INSULIN (HCC): ICD-10-CM

## 2018-08-13 DIAGNOSIS — Z01.818 PREOPERATIVE CLEARANCE: Primary | ICD-10-CM

## 2018-08-13 LAB
EXPIRATION DATE: ABNORMAL
HBA1C MFR BLD: 9.9 %
Lab: ABNORMAL

## 2018-08-13 PROCEDURE — 99214 OFFICE O/P EST MOD 30 MIN: CPT | Performed by: INTERNAL MEDICINE

## 2018-08-13 PROCEDURE — 83036 HEMOGLOBIN GLYCOSYLATED A1C: CPT | Performed by: INTERNAL MEDICINE

## 2018-08-13 NOTE — PROGRESS NOTES
Crystal ERICKSON is a 47 y.o. female, who presents with a chief complaint of   Chief Complaint   Patient presents with   • Surgical Clearance     SI joint fusion - Dr. Andrews-8/22/18   • Joint Pain     SI   • Hyperlipidemia   • Hypertension   • Diabetes       HPI   46 yo female with pmhx insulin dependent diabetes which is starting to get under better control.    She is usually taking 15 units levemir, but taking only 10 if her sugar is under 100 she will hold the insulin.   She has history of hba1c getting over 16, last 12.                The following portions of the patient's history were reviewed and updated as appropriate: allergies, current medications, past family history, past medical history, past social history, past surgical history and problem list.    Allergies: Penicillins    Current Outpatient Prescriptions:   •  atorvastatin (LIPITOR) 20 MG tablet, Take 1 tablet by mouth daily., Disp: 90 tablet, Rfl: 2  •  cyclobenzaprine (FLEXERIL) 10 MG tablet, Take 1 tablet by mouth 3 (Three) Times a Day As Needed for Muscle Spasms., Disp: 30 tablet, Rfl: 0  •  fentaNYL (DURAGESIC) 75 MCG/HR patch, Place 1 patch on the skin Every 72 (Seventy-Two) Hours., Disp: 10 patch, Rfl: 0  •  gabapentin (NEURONTIN) 800 MG tablet, Take 1 tablet by mouth 3 (Three) Times a Day., Disp: 90 tablet, Rfl: 5  •  insulin aspart prot-insulin aspart (NOVOLOG MIX 70/30) (70-30) 100 UNIT/ML injection, Inject 15 Units under the skin 2 (Two) Times a Day With Meals., Disp: 30 mL, Rfl: 3  •  insulin detemir (LEVEMIR) 100 UNIT/ML injection, Inject 25 Units under the skin 2 (Two) Times a Day., Disp: 30 mL, Rfl: 3  •  levothyroxine (SYNTHROID) 125 MCG tablet, Take 1 tablet by mouth Daily., Disp: 30 tablet, Rfl: 3  •  lisinopril (PRINIVIL,ZESTRIL) 20 MG tablet, Take 1 tablet by mouth daily., Disp: 90 tablet, Rfl: 2  •  promethazine (PHENERGAN) 12.5 MG tablet, Take 1 tablet by mouth Every 6 (Six) Hours As Needed for Nausea or Vomiting., Disp:  "60 tablet, Rfl: 2  •  sitaGLIPtin-metFORMIN (JANUMET)  MG per tablet, Take 1 tablet by mouth 2 (two) times a day with meals., Disp: 180 tablet, Rfl: 2  •  DULoxetine (CYMBALTA) 20 MG capsule, Take 1 capsule by mouth Daily., Disp: 30 capsule, Rfl: 2  •  ondansetron ODT (ZOFRAN-ODT) 8 MG disintegrating tablet, DISSOLVE 1 TABLET ON THE TONGUE EVERY 8 HOURS AS NEEDED FOR NAUSEA OR VOMITING, Disp: 30 tablet, Rfl: 0  •  promethazine (PHENERGAN) 50 MG tablet, Take 0.5 tablets by mouth Every 6 (Six) Hours As Needed for Nausea or Vomiting., Disp: 45 tablet, Rfl: 0  There are no discontinued medications.    Review of Systems   Constitutional: Negative.    HENT: Negative.    Cardiovascular: Negative for chest pain and palpitations.   Gastrointestinal: Negative.    Endocrine:        Sugars improved as in hpi, 140s, better diet compliance   Genitourinary: Negative.    Musculoskeletal: Positive for arthralgias, back pain and gait problem.   Hematological: Negative.    Psychiatric/Behavioral: Positive for sleep disturbance. The patient is nervous/anxious.         Due to back pain and upcoming surgery   All other systems reviewed and are negative.            /80 (BP Location: Left arm, Patient Position: Sitting, Cuff Size: Large Adult)   Pulse 92   Temp 98 °F (36.7 °C) (Oral)   Resp 16   Ht 152.4 cm (60\")   Wt 79.4 kg (175 lb)   SpO2 98%   Breastfeeding? No   BMI 34.18 kg/m²       Physical Exam   Constitutional: She is oriented to person, place, and time. She appears well-developed and well-nourished.   HENT:   Head: Normocephalic and atraumatic.   Right Ear: External ear normal.   Left Ear: External ear normal.   Eyes: Pupils are equal, round, and reactive to light. EOM are normal.   Neck: Normal range of motion. Neck supple. No tracheal deviation present. No thyromegaly present.   Cardiovascular: Normal rate, regular rhythm and normal heart sounds.    No murmur heard.  Pulmonary/Chest: Effort normal and " breath sounds normal.   Musculoskeletal: She exhibits no edema.   Neurological: She is alert and oriented to person, place, and time.   Skin: Skin is warm and dry. Capillary refill takes less than 2 seconds.   Psychiatric: She has a normal mood and affect. Her behavior is normal.   Vitals reviewed.      Lab Results (most recent)     None          Results for orders placed or performed in visit on 08/13/18   POCT glycated hemoglobin, total   Result Value Ref Range    Hemoglobin A1C 9.9 %    Lot Number [id number]     Expiration Date 05/31/2020      CXR negative   EKG reviewed flat t waves aVf      Crystal was seen today for surgical clearance, joint pain, hyperlipidemia, hypertension and diabetes.    Diagnoses and all orders for this visit:    Preoperative clearance    Mixed hyperlipidemia    Essential hypertension    Type 2 diabetes mellitus without complication, with long-term current use of insulin (CMS/[facility])  -     POCT glycated hemoglobin, total    SI joint fusion - low risk procedure.  Cleared for surgery.  Uncontrolled dm  Trending down  She is getting much lower at home  She will monitor closely at home three times per day  She will start PT after 10 weeks  Start aspirin 81 mg po daily 3 days after surgery.  Considered perioperative beta blocker, but gets anxious here not high at home. This is outpatient procedure.  Return in about 4 weeks (around 9/10/2018).    Kleber Mcdonald MD  08/13/2018

## 2018-09-07 DIAGNOSIS — M54.5 CHRONIC BILATERAL LOW BACK PAIN, WITH SCIATICA PRESENCE UNSPECIFIED: ICD-10-CM

## 2018-09-07 DIAGNOSIS — G89.29 CHRONIC BILATERAL LOW BACK PAIN, WITH SCIATICA PRESENCE UNSPECIFIED: ICD-10-CM

## 2018-09-07 RX ORDER — GABAPENTIN 800 MG/1
TABLET ORAL
Qty: 90 TABLET | Refills: 0 | Status: SHIPPED | OUTPATIENT
Start: 2018-09-07 | End: 2018-10-18 | Stop reason: SDUPTHER

## 2018-10-18 DIAGNOSIS — M54.5 CHRONIC BILATERAL LOW BACK PAIN, WITH SCIATICA PRESENCE UNSPECIFIED: ICD-10-CM

## 2018-10-18 DIAGNOSIS — G89.29 CHRONIC BILATERAL LOW BACK PAIN, WITH SCIATICA PRESENCE UNSPECIFIED: ICD-10-CM

## 2018-10-19 RX ORDER — GABAPENTIN 800 MG/1
TABLET ORAL
Qty: 90 TABLET | Refills: 5 | Status: SHIPPED | OUTPATIENT
Start: 2018-10-19

## 2018-11-16 ENCOUNTER — TELEPHONE (OUTPATIENT)
Dept: INTERNAL MEDICINE | Facility: CLINIC | Age: 48
End: 2018-11-16

## 2018-11-16 DIAGNOSIS — F11.20 NARCOTIC DEPENDENCE (HCC): Primary | ICD-10-CM

## 2018-11-16 NOTE — TELEPHONE ENCOUNTER
LVM to call back.     ----- Message from Cassie Loo MA sent at 11/16/2018 11:49 AM EST -----  Regarding: FW: PAIN MEDS  Contact: [phone]      ----- Message -----  From: Josesito Juarez  Sent: 11/16/2018  10:59 AM  To: Shann[facility]hengge[facility] Clinical Pool  Subject: PAIN MEDS                                        [facility] PT    Patient calling to try to get an appointment with dr frost as soon as possible. She said that dr frost has been trying to assist her with her pain management and trying to come off of the pain meds. She was very upset, crying, on the phone.    Please call her back asap    Thanks!  josesito

## 2022-04-21 ENCOUNTER — TELEPHONE (OUTPATIENT)
Dept: FAMILY MEDICINE CLINIC | Facility: CLINIC | Age: 52
End: 2022-04-21

## 2023-07-28 ENCOUNTER — PATIENT OUTREACH (OUTPATIENT)
Dept: CASE MANAGEMENT | Facility: OTHER | Age: 53
End: 2023-07-28
Payer: MEDICARE

## 2023-07-28 DIAGNOSIS — F33.1 MODERATE EPISODE OF RECURRENT MAJOR DEPRESSIVE DISORDER: Primary | ICD-10-CM

## 2023-07-28 DIAGNOSIS — Z79.4 TYPE 2 DIABETES MELLITUS WITHOUT COMPLICATION, WITH LONG-TERM CURRENT USE OF INSULIN: ICD-10-CM

## 2023-07-28 DIAGNOSIS — E11.9 TYPE 2 DIABETES MELLITUS WITHOUT COMPLICATION, WITH LONG-TERM CURRENT USE OF INSULIN: ICD-10-CM

## 2023-07-28 NOTE — OUTREACH NOTE
[facility] End of Month Documentation    This Chronic Medical Management Care Plan for Crystal Davis, 52 y.o. female, has been a new plan of care implemented; established and a new plan of care implemented for the month of July.  A cumulative time of 67  minutes was spent on this patient record this month, including phone call with patient; electronic communication with primary care provider; chart review.    Regarding the patient's problems: has Hyperlipidemia; Hypertension; Diabetes; Hypothyroidism (acquired); Left lateral abdominal pain; Chest pain on exertion; Moderate episode of recurrent major depressive disorder; Trochanteric bursitis, left hip; High risk medication use; and SI (sacroiliac) joint dysfunction on their problem list., the following items were addressed: medical records; medications and any changes can be found within the plan section of the note.  A detailed listing of time spent for chronic care management is tracked within each outreach encounter.  Current medications include:  has a current medication list which includes the following prescription(s): cyclobenzaprine, duloxetine, duloxetine, insulin aspart prot-insulin aspart, levothyroxine, lisinopril, metformin er, oxycodone-acetaminophen, and pregabalin. and the patient is reported to be patient is noncompliant with medication protocol,  Medications are reported to be non-effective in controlling symptoms and changes have been made to the medication protocol.  All notes on chart for PCP to review.    The patient was monitored remotely for mood & behavior; blood glucose.    The patient's physical needs include:  help taking medications as prescribed; physical healthcare.     The patient's mental support needs include:  increased support; mental health referral    The patient's cognitive support needs include:  health care    The patient's psychosocial support needs include:  medication management or adherence; need for increased support    The  patient's functional needs include: physical healthcare    The patient's environmental needs include:  not applicable    Care Plan overall comments:  No data recorded    Refer to previous outreach notes for more information on the areas listed above.    Monthly Billing Diagnoses  (F33.1) Moderate episode of recurrent major depressive disorder    (E11.9,  Z79.4) Type 2 diabetes mellitus without complication, with long-term current use of insulin    Medications   Medications have been reconciled    Care Plan progress this month:      Recently Modified Care Plans Updates made since 6/27/2023 12:00 AM       Depression (Adult)           Problem Priority Last Modified     Depression Identification (Depression) --  7/19/2023  9:32 AM by Latasha Stoddard RN              Goal Recent Progress Last Modified     Depressive Symptoms Identified --  7/19/2023  9:32 AM by Latasha Stoddard RN     Evidence-based guidance:   Identify risk for depression by reviewing presenting symptoms and risk factors.   Review use of medications that contribute to depression such as steroid, narcotic, sedative, antihypertensive, beta blocker, cytoxic agent.   Review related metabolic processes, including infection, anemia, thyroid dysfunction, kidney failure, heart failure, alcohol or substance use.   Perform depression screening using standardized tools to obtain baseline intensity of depressive symptoms.   Perform or refer for a full diagnostic interview when positive screening results are noted; use DSM-5 criteria to determine appropriate diagnosis (e.g., major depression, persistent depressive disorder, unspecified depressive    disorder).    Notes:            Task Due Date Last Modified     Identify Depressive Symptoms and Facilitate Treatment 10/23/2023  7/19/2023  9:34 AM by Latasha Stoddard, RN     Care Management Activities:      - anxiety screen reviewed  - mental health treatment arranged  - participation in psychiatric services encouraged       Notes:                Problem Priority Last Modified     Symptoms (Depression) --  7/19/2023  9:32 AM by Latasha Stoddard RN              Goal Recent Progress Last Modified     Symptoms Monitored and Managed --  7/19/2023  9:32 AM by Latasha Stoddard RN     Evidence-based guidance:   Promote use of complementary and alternative medicine therapy including exercise, relaxation, music, bright light or dance therapy, acupuncture, aromatherapy.   Monitor and promote nutrition, pain control and sleep/rest; provide guidance regarding sleep hygiene techniques when patient presents with insomnia.   Explore treatment options in an atmosphere of hope and optimism; support the belief that recovery is possible.   Facilitate and advocate for mental health treatment that may include depression-focused psychotherapy, cognitive-behavioral therapy, social-skills training, relaxation training, problem-solving therapy.   Prepare for use of pharmacotherapy, such as selective serotonin-reuptake inhibitor, tricyclic antidepressant, serotonin norepinephrine-reuptake inhibitor, amino ketone, monoamine-oxidase inhibitor based on presentation and risk factors.   Caution patients who choose over-the-counter S-adenosyl methionine (SERENITY-e) or [place]'s Wort regarding potential drug interactions; engage pharmacist in consultation.   Monitor and manage response to medication and therapy regularly; consider weekly for the first month, then monthly after 4 to 8 weeks of treatment until full remission or for 6 to 24 months; anticipate adjustments to plan.   Prepare patient for potential long-term pharmacologic treatment that may prevent a relapse.   Facilitate shared decision-making regarding treatment, particularly for major depression, that may include electroconvulsive therapy or transcranial magnetic stimulation.   Promote development of physical activity plan to increase the secretion of neurobiological markers, promote social interaction,  distraction and confidence-building.   Explore means to support work reintegration, such as modified working hours, peer support or coaching and community job programs.   Consider referral to community-based health program for support and group activities, such as exercise classes.    Notes:            Task Due Date Last Modified     Alleviate Barriers to Depression Treatment 10/23/2023  7/19/2023  9:35 AM by Latasha Stoddard RN     Care Management Activities:      - activity or exercise based on tolerance encouraged  - emotional support provided  - healthy lifestyle promoted  - participation in mental health treatment encouraged      Notes:                Problem Priority Last Modified     Harm or Injury (Depression) --  7/19/2023  9:32 AM by Latasha Stoddard RN              Goal Recent Progress Last Modified     Harm or Injury Prevented --  7/19/2023  9:32 AM by Latasha Stoddard RN     Evidence-based guidance:   Explore self-harm or suicidal tendencies compassionately, yet directly, by asking about suicidal ideation, attempt history, family history and history of self-harming behaviors.   Make immediate arrangements for evaluation at a local emergency department, community mental health agency or other psychiatric service when patient expresses positive suicidal ideation with a plan and access to lethal means.   Provide anticipatory guidance to remove lethal medication and firearms from home.   Ensure adequate supervision is provided to monitor for increasing risk factors; provide emergency contact information and instructions.   Assess for injurious side effects of pharmacologic therapy, such as drug interactions, sedation and insomnia, as well as cardiovascular, anticholinergic or sexual effects.   Engage family in providing a safe home environment and closely monitoring changes in the patient's emotional state that may include negativity, hopelessness and suicidal ideation.   Maintain frequent, structured and  supportive contact by phone, office or home visit; collaborate closely with behavioral health specialists and psychiatry.    Notes:            Task Due Date Last Modified     Identify and Reduce Risk to Safety 10/23/2023  7/19/2023  9:36 AM by Latasha Stoddard RN     Care Management Activities:      - action plan for worsening symptoms mutually developed      Notes:                Problem Priority Last Modified     Response to Treatment (Depression) --  7/19/2023  9:32 AM by Latasha Stoddard RN              Goal Recent Progress Last Modified     Response to Treatment Maximized --  7/19/2023  9:32 AM by Latasha Stoddard RN     Evidence-based guidance:   Engage patient in conversation about the perceived benefits of mental health treatment and quality of therapeutic alliance with his/her mental health professional.   Assess for barriers to attending appointments, such as transportation, financial, sense of slow or little improvement and forgetfulness.   Consider patient resistance to treatment based on stigma related to mental health diagnosis.   Maintain a documented system of ongoing contacts with patient during the first 6 to 12 months of treatment, as missed appointments and disengagement may signal deteriorating condition.   Provide anticipatory guidance about the risk of increased symptoms and potential psychiatric hospitalization for those who have a pattern of nonattendance at mental health appointments.   Re-screen for depressive symptoms at mutually identified intervals.    Notes:            Task Due Date Last Modified     Facilitate Engagement in Mental Health Services 10/23/2023  7/19/2023  9:36 AM by Latasha Stoddard RN     Care Management Activities:      - barriers to treatment reviewed and addressed  - perceived benefits to therapy discussed  - risk of unmanaged depression discussed      Notes:                     Diabetes Type 2 (Adult)           Problem Priority Last Modified     Glycemic Management  (Diabetes, Type 2) --  7/19/2023  9:32 AM by Latasha Stoddard RN              Goal Recent Progress Last Modified     Glycemic Management Optimized --  7/19/2023  9:32 AM by Latasha Stoddard RN     Evidence-based guidance:   Anticipate A1C testing (point-of-care) every 3 to 6 months based on goal attainment.   Review mutually-set A1C goal or target range.   Anticipate use of antihyperglycemic with or without insulin and periodic adjustments; consider active involvement of pharmacist.   Provide medical nutrition therapy and development of individualized eating.   Compare self-reported symptoms of hypo or hyperglycemia to blood glucose levels, diet and fluid intake, current medications, psychosocial and physiologic stressors, change in activity and barriers to care adherence.   Promote self-monitoring of blood glucose levels.   Assess and address barriers to management plan, such as food insecurity, age, developmental ability, depression, anxiety, fear of hypoglycemia or weight gain, as well as medication cost, side effects and complicated regimen.   Consider referral to community-based diabetes education program, visiting nurse, community health worker or health .   Encourage regular dental care for treatment of periodontal disease; refer to dental provider when needed.    Notes:            Task Due Date Last Modified     Alleviate Barriers to Glycemic Management 10/23/2023  7/19/2023  9:37 AM by Latasha Stoddard RN     Care Management Activities:      - barriers to adherence to treatment plan identified  - blood glucose monitoring encouraged  - blood glucose readings reviewed  - mutual A1C goal set or reviewed  - resources required to improve adherence to care identified  - use of blood glucose monitoring log promoted      Notes:                Problem Priority Last Modified     Disease Progression (Diabetes, Type 2) --  7/19/2023  9:32 AM by Latasha Stoddard RN              Goal Recent Progress Last Modified      Disease Progression Prevented or Minimized --  7/19/2023  9:32 AM by Latasha Stoddard, RN     Evidence-based guidance:   Prepare patient for laboratory and diagnostic exams based on risk and presentation.   Encourage lifestyle changes, such as increased intake of plant-based foods, stress reduction, consistent physical activity and smoking cessation to prevent long-term complications and chronic disease.    Individualize activity and exercise recommendations while considering potential limitations, such as neuropathy, retinopathy or the ability to prevent hyperglycemia or hypoglycemia.    Prepare patient for use of pharmacologic therapy that may include antihypertensive, analgesic, prostaglandin E1 with periodic adjustments, based on presenting chronic condition and laboratory results.   Assess signs/symptoms and risk factors for hypertension, sleep-disordered breathing, neuropathy (including changes in gait and balance), retinopathy, nephropathy and sexual dysfunction.   Address pregnancy planning and contraceptive choice, especially when prescribing antihypertensive or statin.   Ensure completion of annual comprehensive foot exam and dilated eye exam.    Implement additional individualized goals and interventions based on identified risk factors.   Prepare patient for consultation or referral for specialist care, such as ophthalmology, neurology, cardiology, podiatry, nephrology or perinatology.    Notes:            Task Due Date Last Modified     Monitor and Manage Follow-up for Comorbidities 10/23/2023  7/19/2023  9:37 AM by Latasha Stoddard, RN     Care Management Activities:      - activity based on tolerance and functional limitations encouraged  - healthy lifestyle promoted  - reduction of sedentary activity encouraged      Notes:                          Current Specialty Plan of Care Status signed by both patient and provider    Instructions   Patient was provided an electronic copy of care plan  CCM  services were explained and offered and patient has accepted these services.  Patient has given their written consent to receive CCM services and understands that this includes the authorization of electronic communication of medical information with the other treating providers.  Patient understands that they may stop CCM services at any time and these changes will be effective at the end of the calendar month and will effectively revocate the agreement of CCM services.  Patient understands that only one practitioner can furnish and be paid for CCM services during one calendar month.  Patient also understands that there may be co-payment or deductible fees in association with CCM services.  Patient will continue with at least monthly follow-up calls with the Ambulatory .    Latasha HAMMER  Ambulatory Case Management    7/28/2023, 13:25 EDT

## 2023-09-01 ENCOUNTER — PATIENT OUTREACH (OUTPATIENT)
Dept: CASE MANAGEMENT | Facility: OTHER | Age: 53
End: 2023-09-01
Payer: MEDICARE

## 2023-09-01 DIAGNOSIS — F33.1 MODERATE EPISODE OF RECURRENT MAJOR DEPRESSIVE DISORDER: Primary | ICD-10-CM

## 2023-09-01 DIAGNOSIS — Z79.4 TYPE 2 DIABETES MELLITUS WITHOUT COMPLICATION, WITH LONG-TERM CURRENT USE OF INSULIN: ICD-10-CM

## 2023-09-01 DIAGNOSIS — E11.9 TYPE 2 DIABETES MELLITUS WITHOUT COMPLICATION, WITH LONG-TERM CURRENT USE OF INSULIN: ICD-10-CM

## 2023-09-01 NOTE — OUTREACH NOTE
AMBULATORY CASE MANAGEMENT NOTE    Name and Relationship of Patient/Support Person: Crystal Davis - Self    CCM Interim Update    Discussed diabetes management with patient. She states she checks blood sugar daily, but she does not write down values. She states her blood sugar ranges from 150 to low 200's. She states she has been under more stress recently. She is in the process of moving to [place] at end of month. Patient advised to contact ACM if blood sugar increases above 250. Made follow up appointment with PCP on 09/25 to refill medications prior to changing PCP offices. Will contact patient before leaving on 9/29 to inquire about additional needs.         Education Documentation  No documentation found.        Latasha HAMMER  Ambulatory Case Management    9/1/2023, 13:27 EDT

## 2023-10-02 ENCOUNTER — PATIENT OUTREACH (OUTPATIENT)
Dept: CASE MANAGEMENT | Facility: OTHER | Age: 53
End: 2023-10-02
Payer: MEDICARE

## 2023-10-02 DIAGNOSIS — F33.1 MODERATE EPISODE OF RECURRENT MAJOR DEPRESSIVE DISORDER: Primary | ICD-10-CM

## 2023-10-02 DIAGNOSIS — E11.9 TYPE 2 DIABETES MELLITUS WITHOUT COMPLICATION, WITH LONG-TERM CURRENT USE OF INSULIN: ICD-10-CM

## 2023-10-02 DIAGNOSIS — Z79.4 TYPE 2 DIABETES MELLITUS WITHOUT COMPLICATION, WITH LONG-TERM CURRENT USE OF INSULIN: ICD-10-CM

## 2023-10-02 NOTE — OUTREACH NOTE
AMBULATORY CASE MANAGEMENT NOTE    Name and Relationship of Patient/Support Person: Crystal Davis - Self    CCM Interim Update    Patient states she is moving to [place] this week. No additional needs. She will find another PCP at new location. Will close program due to relocation         Education Documentation  No documentation found.        Latasha HAMMER  Ambulatory Case Management    10/2/2023, 10:57 EDT

## 2024-02-20 ENCOUNTER — TELEPHONE (OUTPATIENT)
Dept: FAMILY MEDICINE CLINIC | Facility: CLINIC | Age: 54
End: 2024-02-20
Payer: MEDICARE

## 2024-03-05 ENCOUNTER — TELEPHONE (OUTPATIENT)
Dept: CASE MANAGEMENT | Facility: OTHER | Age: 54
End: 2024-03-05
Payer: MEDICARE

## 2024-03-06 ENCOUNTER — TELEPHONE (OUTPATIENT)
Dept: CASE MANAGEMENT | Facility: OTHER | Age: 54
End: 2024-03-06
Payer: MEDICARE